# Patient Record
Sex: FEMALE | Race: BLACK OR AFRICAN AMERICAN | NOT HISPANIC OR LATINO
[De-identification: names, ages, dates, MRNs, and addresses within clinical notes are randomized per-mention and may not be internally consistent; named-entity substitution may affect disease eponyms.]

---

## 2017-01-31 ENCOUNTER — APPOINTMENT (OUTPATIENT)
Dept: OTOLARYNGOLOGY | Facility: CLINIC | Age: 62
End: 2017-01-31

## 2017-01-31 VITALS
BODY MASS INDEX: 25.18 KG/M2 | RESPIRATION RATE: 18 BRPM | DIASTOLIC BLOOD PRESSURE: 73 MMHG | HEART RATE: 61 BPM | SYSTOLIC BLOOD PRESSURE: 111 MMHG | WEIGHT: 170 LBS | HEIGHT: 69 IN

## 2017-01-31 VITALS — BODY MASS INDEX: 25.18 KG/M2 | WEIGHT: 170 LBS | RESPIRATION RATE: 18 BRPM | HEIGHT: 69 IN

## 2017-01-31 DIAGNOSIS — R51 HEADACHE: ICD-10-CM

## 2017-01-31 DIAGNOSIS — R42 DIZZINESS AND GIDDINESS: ICD-10-CM

## 2017-01-31 DIAGNOSIS — Z82.49 FAMILY HISTORY OF ISCHEMIC HEART DISEASE AND OTHER DISEASES OF THE CIRCULATORY SYSTEM: ICD-10-CM

## 2017-01-31 DIAGNOSIS — Z87.09 PERSONAL HISTORY OF OTHER DISEASES OF THE RESPIRATORY SYSTEM: ICD-10-CM

## 2017-01-31 DIAGNOSIS — M47.812 SPONDYLOSIS W/OUT MYELOPATHY OR RADICULOPATHY, CERVICAL REGION: ICD-10-CM

## 2017-01-31 DIAGNOSIS — J44.9 CHRONIC OBSTRUCTIVE PULMONARY DISEASE, UNSPECIFIED: ICD-10-CM

## 2017-01-31 DIAGNOSIS — M47.816 SPONDYLOSIS W/OUT MYELOPATHY OR RADICULOPATHY, CERVICAL REGION: ICD-10-CM

## 2017-01-31 DIAGNOSIS — Z87.891 PERSONAL HISTORY OF NICOTINE DEPENDENCE: ICD-10-CM

## 2017-01-31 DIAGNOSIS — H92.03 OTALGIA, BILATERAL: ICD-10-CM

## 2017-01-31 RX ORDER — AMLODIPINE BESYLATE 5 MG/1
TABLET ORAL
Refills: 0 | Status: ACTIVE | COMMUNITY

## 2017-01-31 RX ORDER — ASPIRIN 81 MG
81 TABLET, DELAYED RELEASE (ENTERIC COATED) ORAL
Refills: 0 | Status: ACTIVE | COMMUNITY

## 2017-02-01 PROBLEM — H92.03 OTALGIA, BILATERAL: Status: ACTIVE | Noted: 2017-02-01

## 2017-02-01 PROBLEM — R51 HEADACHE: Status: ACTIVE | Noted: 2017-02-01

## 2017-02-01 PROBLEM — R42 VERTIGO: Status: ACTIVE | Noted: 2017-01-31

## 2017-02-24 ENCOUNTER — APPOINTMENT (OUTPATIENT)
Dept: OTOLARYNGOLOGY | Facility: CLINIC | Age: 62
End: 2017-02-24

## 2017-04-04 ENCOUNTER — APPOINTMENT (OUTPATIENT)
Dept: OTOLARYNGOLOGY | Facility: CLINIC | Age: 62
End: 2017-04-04

## 2017-04-04 VITALS
HEIGHT: 69 IN | SYSTOLIC BLOOD PRESSURE: 136 MMHG | DIASTOLIC BLOOD PRESSURE: 79 MMHG | WEIGHT: 170 LBS | BODY MASS INDEX: 25.18 KG/M2 | HEART RATE: 76 BPM

## 2017-04-04 DIAGNOSIS — Z80.8 FAMILY HISTORY OF MALIGNANT NEOPLASM OF OTHER ORGANS OR SYSTEMS: ICD-10-CM

## 2017-04-04 DIAGNOSIS — M06.9 RHEUMATOID ARTHRITIS, UNSPECIFIED: ICD-10-CM

## 2017-04-04 DIAGNOSIS — R49.0 DYSPHONIA: ICD-10-CM

## 2017-04-04 DIAGNOSIS — M35.00 SICCA SYNDROME, UNSPECIFIED: ICD-10-CM

## 2017-04-04 DIAGNOSIS — R85.9 UNSPECIFIED ABNORMAL FINDING IN SPECIMENS FROM DIGESTIVE ORGANS AND ABDOMINAL CAVITY: ICD-10-CM

## 2017-04-04 DIAGNOSIS — R05 COUGH: ICD-10-CM

## 2017-04-04 RX ORDER — OMEPRAZOLE 20 MG/1
20 CAPSULE, DELAYED RELEASE ORAL DAILY
Qty: 30 | Refills: 5 | Status: ACTIVE | COMMUNITY
Start: 2017-04-04 | End: 1900-01-01

## 2017-08-28 ENCOUNTER — EMERGENCY (EMERGENCY)
Facility: HOSPITAL | Age: 62
LOS: 1 days | Discharge: HOME | End: 2017-08-28
Attending: EMERGENCY MEDICINE | Admitting: EMERGENCY MEDICINE
Payer: COMMERCIAL

## 2017-08-28 VITALS
SYSTOLIC BLOOD PRESSURE: 113 MMHG | HEART RATE: 79 BPM | RESPIRATION RATE: 20 BRPM | TEMPERATURE: 98 F | DIASTOLIC BLOOD PRESSURE: 67 MMHG | OXYGEN SATURATION: 100 %

## 2017-08-28 VITALS
RESPIRATION RATE: 16 BRPM | OXYGEN SATURATION: 100 % | HEART RATE: 65 BPM | DIASTOLIC BLOOD PRESSURE: 75 MMHG | SYSTOLIC BLOOD PRESSURE: 115 MMHG

## 2017-08-28 PROCEDURE — 73660 X-RAY EXAM OF TOE(S): CPT | Mod: 26,LT

## 2017-08-28 PROCEDURE — 99284 EMERGENCY DEPT VISIT MOD MDM: CPT

## 2017-08-28 PROCEDURE — 73562 X-RAY EXAM OF KNEE 3: CPT | Mod: 26,50

## 2017-08-28 RX ORDER — IBUPROFEN 200 MG
600 TABLET ORAL ONCE
Qty: 0 | Refills: 0 | Status: COMPLETED | OUTPATIENT
Start: 2017-08-28 | End: 2017-08-28

## 2017-08-28 RX ADMIN — Medication 600 MILLIGRAM(S): at 11:22

## 2017-08-28 RX ADMIN — Medication 40 MILLIGRAM(S): at 13:20

## 2017-08-28 NOTE — ED PROVIDER NOTE - CARE PLAN
Principal Discharge DX:	Rheumatoid arthritis involving multiple sites, unspecified rheumatoid factor presence  Secondary Diagnosis:	Pain in both knees, unspecified chronicity

## 2017-08-28 NOTE — ED ADULT NURSE NOTE - OBJECTIVE STATEMENT
Pt received to room 8 with c/o of left knee pain and swelling for the past few days pt knee is noted with swelling she has decreased ROM secondary to the pain. Her skin is intact respirations are even unlabored. Pt also report pain in her neck that radiates to her chest with movement only.

## 2017-08-28 NOTE — ED ADULT TRIAGE NOTE - CHIEF COMPLAINT QUOTE
Pt with HX of arthritis . was treated in first med yesterday told  to come to ER for xrays  . Pt states developed left knee stiffness and swelling to area and left big toe. since last night with . Pt also reports neck stiffness and pain in chest/rib  area pt states chest feels sore . Pt states today developed pain to right knee and right wrist pain. Pt co joint pain

## 2017-08-28 NOTE — ED PROVIDER NOTE - MEDICAL DECISION MAKING DETAILS
61f, h/o ra not currently on meds, with b/l knee pain and mild l. knee swelling, diffuse upper body pain, started 2 d ago. No erythema/warmth, able to ambulate, taking naproxen at home, d/w rheumatologist, recommending starting steroid taper 40mg x 3 and 20mg x 3d and will see her in next few days for further management.

## 2017-08-28 NOTE — ED PROVIDER NOTE - OBJECTIVE STATEMENT
61y F with PMHx of HTN, rheumatoid arthritis, Sjogren's disease, and diverticulitis presents to the ED with joint pain x 2 days. Pt states 3 nights ago she felt as though something was in her left big toe and tried to remove it. Pt woke up the next morning with joint pains. Pt states joint pain began at left knee, progressing to her neck, right knee, and wrists. Pt c/o wrist stiffness. Pt endorses hoarse voice. Was seen in urgent care and referred to ER for possible aspiration. Took Naproxen and Cyclobenzaprine yesterday. Did not take anything for pain today. No fever, no chills, no sob, no abd pain, no nausea, no vomiting. Medications: Amlodipine. 61y F with PMHx of HTN, rheumatoid arthritis, Sjogren's disease presents to the ED with joint pain x 2 days. Pt states 3 nights ago she felt as though something was in her left big toe and tried to remove it. Pt woke up the next morning with joint pains. Pt states joint pain began at left knee, progressing to her neck, right knee, and wrists. Pt c/o wrist stiffness. Was seen in urgent care and started on naproxen and flexeril which she took yesterday. Did not take anything for pain today. No fever, no chills, no sob, no abd pain, no nausea, no vomiting. Medications: Amlodipine. Rheumatologist Dr. Florentino. Patient has refused plaquinil by rheum 2/2 fear of side effects. No fevers, chills, ha, cp, sob, abd pain, vomiting, diarrhea, dysuria, weakness, or numbness.

## 2017-08-28 NOTE — ED PROVIDER NOTE - PROGRESS NOTE DETAILS
progress spoke to Dr. Aragon colleague of rheumatologist Dr. Florentino pt has hx of RA with Sjogren's. Had full work up showing elevated ESR. Had initially refused starting Plaquenil. Case discussed and recommending starting steroid course 40 mg TID and 20 mg TID and she will see pt at end of week progress spoke to Dr. Aragon colleague of rheumatologist Dr. Florentino pt has hx of RA with Sjogren's. Had full work up showing elevated ESR. Had initially refused starting Plaquenil. Case discussed and recommending starting steroid course 40 mg TID and 20 mg TID and she will see pt at end of week for further w/u progress spoke to Dr. Aragon colleague of rheumatologist Dr. Florentino pt has hx of RA with Sjogren's. Had full work up showing elevated ESR. Had initially refused starting Plaquenil. Case discussed and recommending starting steroid course 40 mg TID and 20 mg TID and she will see pt this week for further management. results d/w patient and copies given, understands symptoms to return and will f/u with rheum this week.

## 2017-08-28 NOTE — ED PROVIDER NOTE - MUSCULOSKELETAL MINIMAL EXAM
b/l le knee pain/swelling, L>R, Right with limited active rom, full passive rom, right with full active and passive rom, no erythema or warmth noted, pulses intact, left first toe with pain on ranging, able to freely range. diffusely tender across chest/shoulder/back, rom full, no swelling or deformities mild b/l le knee swelling, L>R, Right with limited active rom limited by pain, full passive rom, right with full active and passive rom, no erythema or warmth noted, pulses intact, left first toe with pain on ranging, able to freely range. diffusely tender across chest/shoulder/back, rom full, no swelling or deformities

## 2017-08-28 NOTE — ED PROVIDER NOTE - PMH
Arthritis    COPD (Chronic Obstructive Pulmonary Disease) (ICD9 496)    Diverticulitis    Hiatal hernia    Sjogren's disease

## 2018-06-26 ENCOUNTER — APPOINTMENT (OUTPATIENT)
Dept: OTOLARYNGOLOGY | Facility: CLINIC | Age: 63
End: 2018-06-26
Payer: COMMERCIAL

## 2018-06-26 VITALS
HEIGHT: 69 IN | BODY MASS INDEX: 23.99 KG/M2 | WEIGHT: 162 LBS | HEART RATE: 60 BPM | SYSTOLIC BLOOD PRESSURE: 124 MMHG | DIASTOLIC BLOOD PRESSURE: 79 MMHG

## 2018-06-26 DIAGNOSIS — Q30.2: ICD-10-CM

## 2018-06-26 DIAGNOSIS — J31.0 CHRONIC RHINITIS: ICD-10-CM

## 2018-06-26 PROCEDURE — 99213 OFFICE O/P EST LOW 20 MIN: CPT

## 2018-06-26 RX ORDER — BLOOD SUGAR DIAGNOSTIC
STRIP MISCELLANEOUS
Qty: 200 | Refills: 0 | Status: DISCONTINUED | COMMUNITY
Start: 2018-01-18

## 2018-06-26 RX ORDER — FLUTICASONE PROPIONATE 50 UG/1
50 SPRAY, METERED NASAL DAILY
Qty: 1 | Refills: 11 | Status: DISCONTINUED | COMMUNITY
Start: 2017-04-04 | End: 2018-06-26

## 2018-06-26 RX ORDER — KETOCONAZOLE 20 MG/G
2 CREAM TOPICAL
Qty: 30 | Refills: 0 | Status: ACTIVE | COMMUNITY
Start: 2018-01-10

## 2018-06-26 RX ORDER — LANCETS 33 GAUGE
EACH MISCELLANEOUS
Qty: 100 | Refills: 0 | Status: DISCONTINUED | COMMUNITY
Start: 2017-10-25

## 2018-06-26 RX ORDER — MUPIROCIN 20 MG/G
2 OINTMENT TOPICAL
Qty: 22 | Refills: 0 | Status: DISCONTINUED | COMMUNITY
Start: 2018-06-04

## 2018-06-26 RX ORDER — HYDROXYCHLOROQUINE SULFATE 200 MG/1
200 TABLET, FILM COATED ORAL
Qty: 120 | Refills: 0 | Status: DISCONTINUED | COMMUNITY
Start: 2017-12-11

## 2018-06-26 RX ORDER — CIPROFLOXACIN HYDROCHLORIDE 250 MG/1
250 TABLET, FILM COATED ORAL
Qty: 10 | Refills: 0 | Status: DISCONTINUED | COMMUNITY
Start: 2018-03-15

## 2018-06-26 RX ORDER — FLUTICASONE PROPIONATE 0.5 MG/G
0.05 CREAM TOPICAL
Qty: 15 | Refills: 0 | Status: DISCONTINUED | COMMUNITY
Start: 2018-01-10

## 2018-06-26 RX ORDER — CELECOXIB 200 MG/1
200 CAPSULE ORAL
Qty: 30 | Refills: 0 | Status: ACTIVE | COMMUNITY
Start: 2018-05-18

## 2018-06-26 RX ORDER — VALACYCLOVIR 1 G/1
1 TABLET, FILM COATED ORAL
Qty: 4 | Refills: 0 | Status: DISCONTINUED | COMMUNITY
Start: 2017-10-09

## 2018-06-26 RX ORDER — AMLODIPINE BESYLATE 2.5 MG/1
2.5 TABLET ORAL
Qty: 60 | Refills: 0 | Status: DISCONTINUED | COMMUNITY
Start: 2017-12-12

## 2018-06-26 RX ORDER — FLUOCINONIDE 0.5 MG/G
0.05 CREAM TOPICAL
Qty: 30 | Refills: 0 | Status: DISCONTINUED | COMMUNITY
Start: 2018-03-09

## 2018-07-03 ENCOUNTER — APPOINTMENT (OUTPATIENT)
Dept: OTOLARYNGOLOGY | Facility: CLINIC | Age: 63
End: 2018-07-03

## 2019-07-07 ENCOUNTER — INPATIENT (INPATIENT)
Facility: HOSPITAL | Age: 64
LOS: 1 days | Discharge: HOME | End: 2019-07-09
Attending: INTERNAL MEDICINE | Admitting: INTERNAL MEDICINE
Payer: COMMERCIAL

## 2019-07-07 VITALS
DIASTOLIC BLOOD PRESSURE: 82 MMHG | RESPIRATION RATE: 17 BRPM | HEART RATE: 66 BPM | SYSTOLIC BLOOD PRESSURE: 150 MMHG | TEMPERATURE: 98 F | OXYGEN SATURATION: 100 %

## 2019-07-07 DIAGNOSIS — R42 DIZZINESS AND GIDDINESS: ICD-10-CM

## 2019-07-07 DIAGNOSIS — I20.1 ANGINA PECTORIS WITH DOCUMENTED SPASM: ICD-10-CM

## 2019-07-07 DIAGNOSIS — M06.9 RHEUMATOID ARTHRITIS, UNSPECIFIED: ICD-10-CM

## 2019-07-07 PROBLEM — K57.92 DIVERTICULITIS OF INTESTINE, PART UNSPECIFIED, WITHOUT PERFORATION OR ABSCESS WITHOUT BLEEDING: Chronic | Status: ACTIVE | Noted: 2017-08-28

## 2019-07-07 LAB
ALBUMIN SERPL ELPH-MCNC: 4 G/DL — SIGNIFICANT CHANGE UP (ref 3.3–5)
ALP SERPL-CCNC: 60 U/L — SIGNIFICANT CHANGE UP (ref 40–120)
ALT FLD-CCNC: 12 U/L — SIGNIFICANT CHANGE UP (ref 4–33)
ANION GAP SERPL CALC-SCNC: 11 MMO/L — SIGNIFICANT CHANGE UP (ref 7–14)
APPEARANCE UR: CLEAR — SIGNIFICANT CHANGE UP
APTT BLD: 23.7 SEC — LOW (ref 27.5–36.3)
AST SERPL-CCNC: 16 U/L — SIGNIFICANT CHANGE UP (ref 4–32)
BASOPHILS # BLD AUTO: 0.02 K/UL — SIGNIFICANT CHANGE UP (ref 0–0.2)
BASOPHILS NFR BLD AUTO: 0.6 % — SIGNIFICANT CHANGE UP (ref 0–2)
BILIRUB SERPL-MCNC: 0.2 MG/DL — SIGNIFICANT CHANGE UP (ref 0.2–1.2)
BILIRUB UR-MCNC: NEGATIVE — SIGNIFICANT CHANGE UP
BLOOD UR QL VISUAL: NEGATIVE — SIGNIFICANT CHANGE UP
BUN SERPL-MCNC: 12 MG/DL — SIGNIFICANT CHANGE UP (ref 7–23)
CALCIUM SERPL-MCNC: 9 MG/DL — SIGNIFICANT CHANGE UP (ref 8.4–10.5)
CHLORIDE SERPL-SCNC: 106 MMOL/L — SIGNIFICANT CHANGE UP (ref 98–107)
CO2 SERPL-SCNC: 27 MMOL/L — SIGNIFICANT CHANGE UP (ref 22–31)
COLOR SPEC: COLORLESS — SIGNIFICANT CHANGE UP
CREAT SERPL-MCNC: 0.94 MG/DL — SIGNIFICANT CHANGE UP (ref 0.5–1.3)
EOSINOPHIL # BLD AUTO: 0.04 K/UL — SIGNIFICANT CHANGE UP (ref 0–0.5)
EOSINOPHIL NFR BLD AUTO: 1.3 % — SIGNIFICANT CHANGE UP (ref 0–6)
GLUCOSE SERPL-MCNC: 101 MG/DL — HIGH (ref 70–99)
GLUCOSE UR-MCNC: NEGATIVE — SIGNIFICANT CHANGE UP
HCT VFR BLD CALC: 36.7 % — SIGNIFICANT CHANGE UP (ref 34.5–45)
HGB BLD-MCNC: 11.8 G/DL — SIGNIFICANT CHANGE UP (ref 11.5–15.5)
IMM GRANULOCYTES NFR BLD AUTO: 0 % — SIGNIFICANT CHANGE UP (ref 0–1.5)
INR BLD: 1.1 — SIGNIFICANT CHANGE UP (ref 0.88–1.17)
KETONES UR-MCNC: NEGATIVE — SIGNIFICANT CHANGE UP
LEUKOCYTE ESTERASE UR-ACNC: NEGATIVE — SIGNIFICANT CHANGE UP
LYMPHOCYTES # BLD AUTO: 1.73 K/UL — SIGNIFICANT CHANGE UP (ref 1–3.3)
LYMPHOCYTES # BLD AUTO: 55.1 % — HIGH (ref 13–44)
MCHC RBC-ENTMCNC: 31.2 PG — SIGNIFICANT CHANGE UP (ref 27–34)
MCHC RBC-ENTMCNC: 32.2 % — SIGNIFICANT CHANGE UP (ref 32–36)
MCV RBC AUTO: 97.1 FL — SIGNIFICANT CHANGE UP (ref 80–100)
MONOCYTES # BLD AUTO: 0.29 K/UL — SIGNIFICANT CHANGE UP (ref 0–0.9)
MONOCYTES NFR BLD AUTO: 9.2 % — SIGNIFICANT CHANGE UP (ref 2–14)
NEUTROPHILS # BLD AUTO: 1.06 K/UL — LOW (ref 1.8–7.4)
NEUTROPHILS NFR BLD AUTO: 33.8 % — LOW (ref 43–77)
NITRITE UR-MCNC: NEGATIVE — SIGNIFICANT CHANGE UP
NRBC # FLD: 0.02 K/UL — SIGNIFICANT CHANGE UP (ref 0–0)
PH UR: 7 — SIGNIFICANT CHANGE UP (ref 5–8)
PLATELET # BLD AUTO: 188 K/UL — SIGNIFICANT CHANGE UP (ref 150–400)
PMV BLD: 11.4 FL — SIGNIFICANT CHANGE UP (ref 7–13)
POTASSIUM SERPL-MCNC: 3.8 MMOL/L — SIGNIFICANT CHANGE UP (ref 3.5–5.3)
POTASSIUM SERPL-SCNC: 3.8 MMOL/L — SIGNIFICANT CHANGE UP (ref 3.5–5.3)
PROT SERPL-MCNC: 7.2 G/DL — SIGNIFICANT CHANGE UP (ref 6–8.3)
PROT UR-MCNC: NEGATIVE — SIGNIFICANT CHANGE UP
PROTHROM AB SERPL-ACNC: 12.2 SEC — SIGNIFICANT CHANGE UP (ref 9.8–13.1)
RBC # BLD: 3.78 M/UL — LOW (ref 3.8–5.2)
RBC # FLD: 13 % — SIGNIFICANT CHANGE UP (ref 10.3–14.5)
SODIUM SERPL-SCNC: 144 MMOL/L — SIGNIFICANT CHANGE UP (ref 135–145)
SP GR SPEC: 1.01 — SIGNIFICANT CHANGE UP (ref 1–1.04)
TROPONIN T, HIGH SENSITIVITY: 6 NG/L — SIGNIFICANT CHANGE UP (ref ?–14)
UROBILINOGEN FLD QL: NORMAL — SIGNIFICANT CHANGE UP
WBC # BLD: 3.14 K/UL — LOW (ref 3.8–10.5)
WBC # FLD AUTO: 3.14 K/UL — LOW (ref 3.8–10.5)

## 2019-07-07 PROCEDURE — 70498 CT ANGIOGRAPHY NECK: CPT | Mod: 26

## 2019-07-07 PROCEDURE — 70496 CT ANGIOGRAPHY HEAD: CPT | Mod: 26

## 2019-07-07 PROCEDURE — 99223 1ST HOSP IP/OBS HIGH 75: CPT

## 2019-07-07 RX ORDER — DIPHENHYDRAMINE HCL 50 MG
50 CAPSULE ORAL EVERY 4 HOURS
Refills: 0 | Status: DISCONTINUED | OUTPATIENT
Start: 2019-07-07 | End: 2019-07-09

## 2019-07-07 RX ORDER — ONDANSETRON 8 MG/1
4 TABLET, FILM COATED ORAL EVERY 6 HOURS
Refills: 0 | Status: DISCONTINUED | OUTPATIENT
Start: 2019-07-07 | End: 2019-07-09

## 2019-07-07 RX ORDER — MECLIZINE HCL 12.5 MG
25 TABLET ORAL ONCE
Refills: 0 | Status: COMPLETED | OUTPATIENT
Start: 2019-07-07 | End: 2019-07-07

## 2019-07-07 RX ORDER — METOCLOPRAMIDE HCL 10 MG
10 TABLET ORAL ONCE
Refills: 0 | Status: COMPLETED | OUTPATIENT
Start: 2019-07-07 | End: 2019-07-07

## 2019-07-07 RX ORDER — MECLIZINE HCL 12.5 MG
25 TABLET ORAL EVERY 6 HOURS
Refills: 0 | Status: DISCONTINUED | OUTPATIENT
Start: 2019-07-07 | End: 2019-07-09

## 2019-07-07 RX ORDER — SODIUM CHLORIDE 9 MG/ML
1000 INJECTION INTRAMUSCULAR; INTRAVENOUS; SUBCUTANEOUS ONCE
Refills: 0 | Status: COMPLETED | OUTPATIENT
Start: 2019-07-07 | End: 2019-07-07

## 2019-07-07 RX ADMIN — SODIUM CHLORIDE 1000 MILLILITER(S): 9 INJECTION INTRAMUSCULAR; INTRAVENOUS; SUBCUTANEOUS at 08:53

## 2019-07-07 RX ADMIN — Medication 1 MILLIGRAM(S): at 07:58

## 2019-07-07 RX ADMIN — Medication 1 MILLIGRAM(S): at 11:33

## 2019-07-07 RX ADMIN — Medication 25 MILLIGRAM(S): at 19:23

## 2019-07-07 RX ADMIN — Medication 25 MILLIGRAM(S): at 07:59

## 2019-07-07 RX ADMIN — SODIUM CHLORIDE 1000 MILLILITER(S): 9 INJECTION INTRAMUSCULAR; INTRAVENOUS; SUBCUTANEOUS at 07:58

## 2019-07-07 RX ADMIN — Medication 10 MILLIGRAM(S): at 07:58

## 2019-07-07 NOTE — H&P ADULT - NSHPREVIEWOFSYSTEMS_GEN_ALL_CORE
Review of Systems:   CONSTITUTIONAL: No fever, weight loss, or fatigue  EYES: No eye pain, visual disturbances, or discharge  ENMT:  No difficulty hearing, tinnitus, vertigo; No sinus or throat pain  NECK: No pain or stiffness  BREASTS: No pain, masses, or nipple discharge  RESPIRATORY: No cough, wheezing, chills or hemoptysis; No shortness of breath  CARDIOVASCULAR: No chest pain, palpitations, dizziness, or leg swelling  GASTROINTESTINAL: No abdominal or epigastric pain. No hematemesis; No diarrhea or constipation. No melena or hematochezia. +Nausea  GENITOURINARY: No dysuria, frequency, hematuria, or incontinence  NEUROLOGICAL: No headaches, memory loss, loss of strength, numbness, or tremors  SKIN: No itching, burning, rashes, or lesions   LYMPH NODES: No enlarged glands  ENDOCRINE: No heat or cold intolerance; No hair loss  MUSCULOSKELETAL: No joint pain or swelling; No muscle, back, or extremity pain  PSYCHIATRIC: No depression, anxiety, mood swings, or difficulty sleeping  HEME/LYMPH: No easy bruising, or bleeding gums  ALLERGY AND IMMUNOLOGIC: No hives or eczema

## 2019-07-07 NOTE — ED PROVIDER NOTE - ATTENDING CONTRIBUTION TO CARE
MD Andrews:  patient seen and evaluated with the resident.  I was present for key portions of the History & Physical, and I agree with the Impression & Plan.  MD Andrews:  62 yo F, c/o severe room-spinning sensation.  Duration ~ 10 hrs.  Better: keeping head still.  Worse: head movement.  Intensity 10/10 when rotating head.  Associated Sx: no HA, no neck pain, no focal weakness, numbness, blurry/double vision, or slurred speech.  Context: Mhx Takotsubo cardiomyopathy.  Of note the patient has severe claustrophobia, and has required conscious sedation even for open MRI.  VS: unremarkable.  Physical Exam: adult F, mild distress, +horizontal nystagmus, neck supple, RRR, CTA B, Abd: s/nd/nt, Ext: no edema, Neuro: AAOx3, ambulates w/o diff, strength 5/5 & symmetric throughout.  Impression:  BPPV vs VBI.  Given Hx of severe claustrophobia, most practical approach would be to perform CTA head/neck, due to anticipated difficulties in performing MRI/MRA.  Not a good CDU candidate bc of severe claustrophobia.   Plan:  labs, ecg, ct head, cta head/neck, reassess. Patient signed out to Dr. Sams with CTA pending.

## 2019-07-07 NOTE — H&P ADULT - HISTORY OF PRESENT ILLNESS
62 y/o F with hx of vertigo, Sjogrens, RA, hiatal hernia, Takotsubo's cardiomyopathy, chronic chest pain p/w dizziness described as room spinning sensation since last night. Started with headache, frontal and left occipital, upset stomach and nausea. A few hours later developed dizziness, worse with movement of head and changes in position. +vertigo this episode more intense and lasting much longer. In ED given ativan 2 mg IV, reglan 10 and meclizine 62 y/o F with hx of vertigo, Sjogrens, RA, hiatal hernia, Takotsubo's cardiomyopathy, chronic chest pain p/w dizziness described as room spinning sensation since last night. Started with headache, frontal and left occipital, upset stomach and nausea. A few hours later developed dizziness, worse with movement of head and changes in position. +vertigo this episode more intense and lasting much longer. In ED given ativan 2 mg IV, reglan 10 and meclizine. Pt notes headache now resolved.

## 2019-07-07 NOTE — CONSULT NOTE ADULT - SUBJECTIVE AND OBJECTIVE BOX
HISTORY OF PRESENT ILLNESS: HPI:    Patient drowsy 2/2 medications given for severe HA. Interview assisted by  and ED documentation.       64 y/o F with hx of Sjogrens, RA, COPD, hiatal hernia, Takotsubo's cardiomyopathy, chronic chest pain presents with dizziness described as room spinning sensation since 9pm last night. Started with headache, frontal and left occipital, upset stomach and nausea/vomiting x 1 after eating dinner. A few hours later developed dizziness, worse with movement of head and changes in position. Pt has hx of vertigo but this episode has lasted longer and is more intense. Pt was admitted for chest pain in 2013, she had positive trops at the time and had cath and echo showing no CAD, EF 40%, showed apical ballooning syndrome consistent with Takosubo's cardiomyopathy. Recently seen in office where she has a stress echo with no evidence of inducible ischemia and echo with normal LV function. Denies chest pain, sob, cough, chills, fever, abd pain, le edema or weakness, sick contacts or recent travel. Cardiology following for dizziness, r/o cardiac nature.      PAST MEDICAL & SURGICAL HISTORY:  Diverticulitis  Arthritis  Sjogren's disease  Hiatal hernia  COPD (Chronic Obstructive Pulmonary Disease) (ICD9 496)  Fallopian tube disorder    MEDICATIONS:  MEDICATIONS  (STANDING):    Allergies    No Known Allergies    FAMILY HISTORY:  No pertinent family history in first degree relatives    Non-contributary for premature coronary disease or sudden cardiac death    SOCIAL HISTORY:    [X ] Non-smoker  [ ] Smoker  [ ] Alcohol      REVIEW OF SYSTEMS:  [ ]chest pain  [  ]shortness of breath  [  ]palpitations  [  ]syncope  [ ]near syncope [ ]upper extremity weakness   [ ] lower extremity weakness  [  ]diplopia  [  ]altered mental status   [X]  Headache [X] Dizziness   [  ]fevers  [ ]chills [ X]nausea  [ ]vomitting  [  ]dysphagia    [ ]abdominal pain  [ ]melena  [ ]BRBPR    [  ]epistaxis  [  ]rash    [ ]lower extremity edema        [ X] All others negative	  [ ] Unable to obtain    LABS:	 	                       11.8   3.14  )-----------( 188      ( 07 Jul 2019 07:28 )             36.7     Hb Trend: 11.8<--    07-07    144  |  106  |  12  ----------------------------<  101<H>  3.8   |  27  |  0.94    Ca    9.0      07 Jul 2019 07:28    TPro  7.2  /  Alb  4.0  /  TBili  0.2  /  DBili  x   /  AST  16  /  ALT  12  /  AlkPhos  60  07-07    Creatinine Trend: 0.94<--    Coags:  PT/INR - ( 07 Jul 2019 07:28 )   PT: 12.2 SEC;   INR: 1.10          PTT - ( 07 Jul 2019 07:28 )  PTT:23.7 SEC    proBNP:   Lipid Profile:   HgA1c:   TSH:     PHYSICAL EXAM:  T(C): 36.4 (07-07-19 @ 15:19), Max: 36.7 (07-07-19 @ 05:49)  HR: 52 (07-07-19 @ 15:19) (52 - 66)  BP: 151/61 (07-07-19 @ 15:19) (144/62 - 160/85)  RR: 16 (07-07-19 @ 15:19) (16 - 17)  SpO2: 100% (07-07-19 @ 15:19) (100% - 100%)  Wt(kg): --  I&O's Summary      Gen: Appears well in NAD  CV: N S1 S2 1/6 KAREN (+)2 Pulses B/l  Resp:  Clear to auscultation  B/L, normal effort  GI: (+) BS Soft, NT, ND  Lymph:  (-)Edema, (-)obvious lymphadenopathy  Skin: Warm to touch, Normal turgor      TELEMETRY: 	  NSR     ECG:  	    RADIOLOGY:         CXR:   < from: CT Angio Head w/ IV Cont (07.07.19 @ 11:40) >  IMPRESSION:     CT brain: There is no evidence of acute intracranial hemorrhage,   extra-axial collection, midline shift, or hydrocephalus.     CT angiography neck: No evidence of hemodynamically significant stenosis   by NASCET criteria. No evidence of vascular dissection. Ossification   posterior marginal ligament at C5-C6 narrowing the canal AP to 7.6 mm,   reversalof cervical lordosis.    CT angiography brain: No major vessel occlusion or proximal stenosis by   NASCET criteria. No evidence of aneurysm or other vascular malformation.    If symptoms persist, consider follow-up head CT or MRI if there are no   contra indications.    SAMIR LINARES M.D., RADIOLOGY RESIDENT  This document has been electronically signed.  AUGUSTIN SHARMA M.D., ATTENDING RADIOLOGIST  This document has been electronically signed. Jul 7 2019  1:26PM        < end of copied text >      ASSESSMENT/PLAN:     64 y/o F with hx of Sjogrens, RA, COPD, hiatal hernia, Takotsubo's cardiomyopathy, chronic chest pain presents with chest pain and dizziness. Cardiology following for dizziness, r/o cardiac nature.     -- no palpitations or chest pain, no sob, + dizziness  -- Trop x1  6 negative, would trend with CK, symptoms more c/w vertigo  -- last echo in our office with normal LV function, EF 53% improved from EF 44% in 2013   -- neurology consult appreciated; outpt f/u with ENT and vestibular rehab and neuro clinic  -- if chest pain persists would plan for ischemic eval   -- will follow with you

## 2019-07-07 NOTE — ED ADULT NURSE NOTE - NSIMPLEMENTINTERV_GEN_ALL_ED
Implemented All Universal Safety Interventions:  [place] to call system. Call bell, personal items and telephone within reach. Instruct patient to call for assistance. Room bathroom lighting operational. Non-slip footwear when patient is off stretcher. Physically safe environment: no spills, clutter or unnecessary equipment. Stretcher in lowest position, wheels locked, appropriate side rails in place.

## 2019-07-07 NOTE — ED ADULT NURSE REASSESSMENT NOTE - NS ED NURSE REASSESS COMMENT FT1
MD Ware at bedside placing IV guided ultrasound, pt reports anxiety, medicated as per MD orders prior to ct scan, pt stable, reports dizziness still, pt unsteady when ambulating, educated pt to not get up, safety maintained  at bedside pt sent to ct scan will monitor,

## 2019-07-07 NOTE — ED PROVIDER NOTE - SHIFT CHANGE DETAILS
Impression:  BPPV vs VBI.  Given Hx of severe claustrophobia, most practical approach would be to perform CTA head/neck, due to anticipated difficulties in performing MRI/MRA.  Not a good CDU candidate bc of severe claustrophobia.   Plan:  labs, ecg, ct head, cta head/neck, reassess. Patient signed out to Dr. Sams with CTA pending.

## 2019-07-07 NOTE — ED ADULT NURSE NOTE - OBJECTIVE STATEMENT
Patient is awake, A&O x 3, NAD, presents to ED for dizziness.  She was sitting watching tv when she started to experience a sudden onset of dizziness, 1 episode of vomiting and chest pain.  No SOB or weakness.  Per patient she has a history of elevated cardiac enzymes.  History of COPD, Sjogren's disease and hiatal hernia.  She has experienced vertigo in the past.  22g IV left AC, labs drawn and sent, vitals taken and will continue to monitor patient.

## 2019-07-07 NOTE — H&P ADULT - ASSESSMENT
Pt is 64 yo F w/ hx takotsubo cardiomyopathy, RA, Sjogrens, vertigo p/w dizziness. CTA H/N neg for vessel stenosis or obstruction still with persistent dizziness. Pt is 64 yo F w/ hx takotsubo cardiomyopathy, RA, Sjogrens, vertigo p/w HA dizziness. CTA H/N neg for vessel stenosis or obstruction still with persistent dizziness.

## 2019-07-07 NOTE — ED ADULT NURSE REASSESSMENT NOTE - NS ED NURSE REASSESS COMMENT FT1
break cvg rn: patient resting comfortably in nad, offers no complaints of pain or discomfort, patient admitted to hospital awaiting bed assignment at this time

## 2019-07-07 NOTE — ED PROVIDER NOTE - NS ED ROS FT
Associated Sx: no HA, no neck pain, no focal weakness, numbness, blurry/double vision, or slurred speech.

## 2019-07-07 NOTE — H&P ADULT - NSICDXPASTMEDICALHX_GEN_ALL_CORE_FT
PAST MEDICAL HISTORY:  Arthritis RA    COPD (Chronic Obstructive Pulmonary Disease) (ICD9 496)     Diverticulitis     Hiatal hernia     Sjogren's disease     Takotsubo cardiomyopathy s/p cath with normal coronaries

## 2019-07-07 NOTE — ED ADULT TRIAGE NOTE - CHIEF COMPLAINT QUOTE
Pt [facility], c/o Dizziness, Lightheaded, "Room is like Spinning" with Headache Chest pain, Neck pain Nausea, Denies SOB Vomiting  PMHX Vertigo Sjorgen's  RA

## 2019-07-07 NOTE — CONSULT NOTE ADULT - ASSESSMENT
63F  w/ PMH Sjorgen, RA, COPD, CM EF 40%, who presents w/ room-spinning dizziness for 1 day, w/ no changes in speech, swallowing vision, numbness/weakness. Exam findings remarkable for vertical nystagmus noted on upward and downward gaze, negative head thrust, mild RUE pronator drifts, and brisk reflexes throughout. CTH/CTA negative for any acute intracranial abnormality or major vessel occlusion.     Impression: Although patient has symptoms and exam findings that could be secondary to a central etiology (vertical nystagmus, RUE drift, hyperreflexia), the spontaneous improvement in her symptoms since yesterday, as well as the improvement w/ Meclizine are inconsistent w/ a central cause. Symptoms more likely to be secondary to a peripheral vertigo, BPPV being the most probable (given the positional nature of the symptoms). Less likely to be labyrinthitis (no recent infections) vs. Meinerres disease (no hearing changes, tinnitus)     Plan:    -No need for further imaging   -Can follow up with ENT for outpatient  -Refer to vestibular rehab (can send to [facility])   -Can follow up as need w/ Neurology outpatient (Clinic at [address] [place] [address] #[phone])

## 2019-07-07 NOTE — H&P ADULT - NSHPLABSRESULTS_GEN_ALL_CORE
11.8   3.14  )-----------( 188      ( 2019 07:28 )             36.7       -    144  |  106  |  12  ----------------------------<  101<H>  3.8   |  27  |  0.94    Ca    9.0      2019 07:28    TPro  7.2  /  Alb  4.0  /  TBili  0.2  /  DBili  x   /  AST  16  /  ALT  12  /  AlkPhos  60        CAPILLARY BLOOD GLUCOSE      POCT Blood Glucose.: 93 mg/dL (2019 05:57)      Urinalysis Basic - ( 2019 11:00 )    Color: COLORLESS / Appearance: CLEAR / S.009 / pH: 7.0  Gluc: NEGATIVE / Ketone: NEGATIVE  / Bili: NEGATIVE / Urobili: NORMAL   Blood: NEGATIVE / Protein: NEGATIVE / Nitrite: NEGATIVE   Leuk Esterase: NEGATIVE / RBC: x / WBC x   Sq Epi: x / Non Sq Epi: x / Bacteria: x        PT/INR - ( 2019 07:28 )   PT: 12.2 SEC;   INR: 1.10          PTT - ( 2019 07:28 )  PTT:23.7 SEC    Radiology  < from: CT Angio Head w/ IV Cont (19 @ 11:40) >    IMPRESSION:     CT brain: There is no evidence of acute intracranial hemorrhage,   extra-axial collection, midline shift, or hydrocephalus.     CT angiography neck: No evidence of hemodynamically significant stenosis   by NASCET criteria. No evidence of vascular dissection. Ossification   posterior marginal ligament at C5-C6 narrowing the canal AP to 7.6 mm,   reversalof cervical lordosis.    CT angiography brain: No major vessel occlusion or proximal stenosis by   NASCET criteria. No evidence of aneurysm or other vascular malformation.    If symptoms persist, consider follow-up head CT or MRI if there are no   contra indications.    < end of copied text >

## 2019-07-07 NOTE — ED ADULT NURSE REASSESSMENT NOTE - NS ED NURSE REASSESS COMMENT FT1
pt stable and comfortable  at bedside, denies n/v/d cp sob pt reports dizziness still like room spinning, pt medicated as per MD orders,

## 2019-07-07 NOTE — CONSULT NOTE ADULT - ATTENDING COMMENTS
Patient seen and examined with resident.  She c/o HA the day prior to onset of vertigo, not positional, with nausea.  She states she had diplopia yesterday and today images are blurred.  On examination she has right beating nystagmus on right gaze, up beating nystagmus on up gaze and a skew with looking down.  Toes down, power 5/5, face symmetric.     The symptoms along with the exam findings localize to the pulvinar of the thalamus as well as likely midbrain/christiana.  Needs urgent MRI brain w/wo contrast and MRA head and neck.  DDx includes evolving basilar stenosis vs. lacunar stroke vs. mass.  Will make recommendations after imaging complete today.

## 2019-07-07 NOTE — H&P ADULT - PROBLEM SELECTOR PLAN 1
-central vs pheripheral   -F/u full neuro c/s-neuro suspicious of peripheral vertigo but as per PE RT UE drift and not consistent with pheripheral vertigo will treat conservative measures x 24 hrs if improved consider MRI with sedation as pt is claustrophobic  -meclizine 25 mg PO q6 standing

## 2019-07-07 NOTE — ED PROVIDER NOTE - CLINICAL SUMMARY MEDICAL DECISION MAKING FREE TEXT BOX
64 y/o F with persistent dizziness for 1 day, inability to ambulate due to feeling unsteady, CTA head and neck done in ER neg. Pt seen by neuro who believes to be peripheral vertigo (see neuro consult note). Labs unremarkable. Pt given reglan, meclizine, ativan, benadryl in ER.  Will admit for persistent vertigo.

## 2019-07-07 NOTE — H&P ADULT - NSHPPHYSICALEXAM_GEN_ALL_CORE
Vital Signs Last 24 Hrs  T(C): 36.4 (07 Jul 2019 15:19), Max: 36.7 (07 Jul 2019 05:49)  T(F): 97.5 (07 Jul 2019 15:19), Max: 98.1 (07 Jul 2019 05:49)  HR: 52 (07 Jul 2019 15:19) (52 - 66)  BP: 151/61 (07 Jul 2019 15:19) (144/62 - 160/85)  BP(mean): --  RR: 16 (07 Jul 2019 15:19) (16 - 17)  SpO2: 100% (07 Jul 2019 15:19) (100% - 100%)    PHYSICAL EXAM:  GENERAL: states still feels dizzy   HEAD: Normocephalic  EYES: EOMI, conjunctiva and sclera clear  NECK: Supple  CHEST/LUNG: Clear to auscultation bilaterally; No wheeze  HEART: s1/s2  ABDOMEN: Soft, Nontender, Nondistended; Bowel sounds present  EXTREMITIES:  No clubbing, cyanosis, or edema  PSYCH: AAOx3  NEUROLOGY: +horizontal nstagmus dizziness worsen with position change  SKIN: No rashes or lesions

## 2019-07-07 NOTE — CONSULT NOTE ADULT - SUBJECTIVE AND OBJECTIVE BOX
CC: Dizziness    HPI:  63F  w/ PMH Sjorgen, RA, COPD, CM EF 40%, who presents w/ dizziness. Neurology consulted for dizziness.     Patient states she started feeling dizzy at 10pm yesterday, described as room-spinning sensation and feeling off balance. Dizziness came on suddenly while sitting down in the couch. Exacerbated w/ sudden head movements both horizontally and vertically, but not particularly worse w/ laying down or bending forward. Endorses nauasea and 1 episode of vomiting yesterday night. Symptoms w/ notable improvement with Meclizine given in the ED.  Denies any difficulty speaking, difficulty swallowing, double vision, facial assymetry, numbness/tingling/weakness, difficulty walking. Denies any recent ear infections hearing changes, tinnitus. Denies any previous history of dizziness.     Denies any previous history of stroke/TIA, seizures, or any other neurological process. Denies any cardiac arrythmias, CAD. Not on any anticoagulations, antiplatelet therapy.     A 10-system ROS was performed and is negative except for those items noted above and/or in the HPI.    PAST MEDICAL & SURGICAL HISTORY:  Sjogren's disease  COPD (Chronic Obstructive Pulmonary Disease) (ICD9 496)  Cardiomyopathy     FAMILY HISTORY:    SOCIAL HISTORY:   T/E/D: Never smoker     ALLERGIES/INTOLERANCES:  NKDA    VITALS & EXAMINATION:  Vital Signs Last 24 Hrs  T(C): 36.4 (07 Jul 2019 11:25), Max: 36.7 (07 Jul 2019 05:49)  T(F): 97.5 (07 Jul 2019 11:25), Max: 98.1 (07 Jul 2019 05:49)  HR: 52 (07 Jul 2019 11:25) (52 - 66)  BP: 144/62 (07 Jul 2019 11:25) (144/62 - 160/85)  BP(mean): --  RR: 17 (07 Jul 2019 11:25) (16 - 17)  SpO2: 100% (07 Jul 2019 11:25) (100% - 100%)    General:  Constitutional: Well appearing female in no distress   Head: Normocephalic & atraumatic.  ENT: TM intact. No nystagmus noted head thrust.   Respiratory: CTAB  Cardiovascular (>2): RRR no murmurs. Carotid pulsations symmetric, no bruits.     Neurological (>12):  MS: Alert to self, [facility], July 7th 2019. Santos Joy. Previous president Lucien. Follow simple commands, thumbs up, points to ceiling.     Language: Speech is clear, fluent with good repetition & comprehension (able to name objects watch, tag, pen, button.     CNs: PERRLA (R = 3mm, L = 3mm). VFF. EOMI, vertical nystagmus noted on upward and downward gaze. No horizontal nystagmus, no diplopia. V1-3 intact to LT/pinprick, well developed masseter muscles b/l. No facial asymmetry b/l, full eye closure strength b/l. Hearing grossly normal (rubbing fingers) b/l. Symmetric palate elevation in midline. Gag reflex deferred. Head turning & shoulder shrug intact b/l. Tongue midline, normal movements, no atrophy.    Motor: Normal muscle bulk & tone. No noticeable tremor or seizure. Mild pronater drift of RUE. Otherwise full strength throughout. 	     Sensation: Intact to Light touch and PP throghout     Cortical: Extinction on DSS (neglect): none    Reflexes: 3+ bilaterally throughout     Coordination: intact rapid-alt movements. No dysmetria to FTN/HTS    Gait: Deferred    LABORATORY:  CBC                       11.8   3.14  )-----------( 188      ( 07 Jul 2019 07:28 )             36.7     Chem 07-07    144  |  106  |  12  ----------------------------<  101<H>  3.8   |  27  |  0.94    Ca    9.0      07 Jul 2019 07:28    TPro  7.2  /  Alb  4.0  /  TBili  0.2  /  DBili  x   /  AST  16  /  ALT  12  /  AlkPhos  60  07-07    LFTs LIVER FUNCTIONS - ( 07 Jul 2019 07:28 )  Alb: 4.0 g/dL / Pro: 7.2 g/dL / ALK PHOS: 60 u/L / ALT: 12 u/L / AST: 16 u/L / GGT: x           Coagulopathy PT/INR - ( 07 Jul 2019 07:28 )   PT: 12.2 SEC;   INR: 1.10       PTT - ( 07 Jul 2019 07:28 )  PTT:23.7 SEC    Radiology (XR, CT, MR, U/S, TTE/ANJALI):  CTH negative for any acute intracranial abnormality  CT angiography negative for any major vessel occlusion

## 2019-07-07 NOTE — ED PROVIDER NOTE - OBJECTIVE STATEMENT
62 y/o F with hx of Sjogrens, RA, COPD, hiatal hernia, Takotsubo's cardiomyopathy, chronic chest pain presents with dizziness described as room spinning sensation since 9pm last night. Started with headache, frontal and left occipital, upset stomach and nausea/vomiting x 1 after eating dinner. A few hours later developed dizziness, worse with movement of head and changes in position. Pt has hx of vertigo but this episode has lasted longer and is more intense. On exam, pt is A&O x 3, PEERLA, No focal deficits, EOMI bilat with horizontal nystagmus (reproduces dizziness), finger to nose test is slow but intact.  Pt has severe claustrophobia, she will be unable to get MRI without conscious sedation.  Pending CTA head and neck. Pt given ativan and meclizine. Reviewed prior hospital records, Pt was admitted for chest pain in 2013, she had positive trops at the time and had cath and echo showing no CAD, EF 40%, showed apical ballooning syndrome consistent with Takosubo's cardiomyopathy.

## 2019-07-07 NOTE — CONSULT NOTE ADULT - ATTENDING COMMENTS
Patient seen and examined.  Agree with above.   admitted with HA and dizziness  last tte with normal lv function  follow up neuro workup  further workup pending clinical course    Davina Rhoades MD

## 2019-07-07 NOTE — ED ADULT NURSE REASSESSMENT NOTE - NS ED NURSE REASSESS COMMENT FT1
received report from CHUN De Jesus, pt A&OX3 c/o dizziness like room is spinning, states this is new and has never happened, denies n/v/d cp sob blurry vision, pt medicated by night nurse will reassess, safety maintained  at bedside, advised pt to not get up if she is dizzy call bell given, pt pending ct scan will monitor,

## 2019-07-07 NOTE — ED PROVIDER NOTE - CARE PLAN
Principal Discharge DX:	Vertigo Principal Discharge DX:	Vertigo of central origin, unspecified laterality

## 2019-07-08 LAB
ALBUMIN SERPL ELPH-MCNC: 3.3 G/DL — SIGNIFICANT CHANGE UP (ref 3.3–5)
ALP SERPL-CCNC: 52 U/L — SIGNIFICANT CHANGE UP (ref 40–120)
ALT FLD-CCNC: 10 U/L — SIGNIFICANT CHANGE UP (ref 4–33)
ANION GAP SERPL CALC-SCNC: 10 MMO/L — SIGNIFICANT CHANGE UP (ref 7–14)
AST SERPL-CCNC: 13 U/L — SIGNIFICANT CHANGE UP (ref 4–32)
BASOPHILS # BLD AUTO: 0.03 K/UL — SIGNIFICANT CHANGE UP (ref 0–0.2)
BASOPHILS NFR BLD AUTO: 0.9 % — SIGNIFICANT CHANGE UP (ref 0–2)
BILIRUB SERPL-MCNC: 0.2 MG/DL — SIGNIFICANT CHANGE UP (ref 0.2–1.2)
BUN SERPL-MCNC: 12 MG/DL — SIGNIFICANT CHANGE UP (ref 7–23)
CALCIUM SERPL-MCNC: 8.8 MG/DL — SIGNIFICANT CHANGE UP (ref 8.4–10.5)
CHLORIDE SERPL-SCNC: 106 MMOL/L — SIGNIFICANT CHANGE UP (ref 98–107)
CO2 SERPL-SCNC: 26 MMOL/L — SIGNIFICANT CHANGE UP (ref 22–31)
CREAT SERPL-MCNC: 0.89 MG/DL — SIGNIFICANT CHANGE UP (ref 0.5–1.3)
EOSINOPHIL # BLD AUTO: 0.06 K/UL — SIGNIFICANT CHANGE UP (ref 0–0.5)
EOSINOPHIL NFR BLD AUTO: 1.8 % — SIGNIFICANT CHANGE UP (ref 0–6)
GLUCOSE SERPL-MCNC: 78 MG/DL — SIGNIFICANT CHANGE UP (ref 70–99)
HCT VFR BLD CALC: 34 % — LOW (ref 34.5–45)
HCV AB S/CO SERPL IA: 0.09 S/CO — SIGNIFICANT CHANGE UP (ref 0–0.99)
HCV AB SERPL-IMP: SIGNIFICANT CHANGE UP
HGB BLD-MCNC: 11.1 G/DL — LOW (ref 11.5–15.5)
IMM GRANULOCYTES NFR BLD AUTO: 1.5 % — SIGNIFICANT CHANGE UP (ref 0–1.5)
LYMPHOCYTES # BLD AUTO: 1.74 K/UL — SIGNIFICANT CHANGE UP (ref 1–3.3)
LYMPHOCYTES # BLD AUTO: 50.9 % — HIGH (ref 13–44)
MCHC RBC-ENTMCNC: 31.2 PG — SIGNIFICANT CHANGE UP (ref 27–34)
MCHC RBC-ENTMCNC: 32.6 % — SIGNIFICANT CHANGE UP (ref 32–36)
MCV RBC AUTO: 95.5 FL — SIGNIFICANT CHANGE UP (ref 80–100)
MONOCYTES # BLD AUTO: 0.29 K/UL — SIGNIFICANT CHANGE UP (ref 0–0.9)
MONOCYTES NFR BLD AUTO: 8.5 % — SIGNIFICANT CHANGE UP (ref 2–14)
NEUTROPHILS # BLD AUTO: 1.25 K/UL — LOW (ref 1.8–7.4)
NEUTROPHILS NFR BLD AUTO: 36.4 % — LOW (ref 43–77)
NRBC # FLD: 0 K/UL — SIGNIFICANT CHANGE UP (ref 0–0)
PLATELET # BLD AUTO: 142 K/UL — LOW (ref 150–400)
PMV BLD: SIGNIFICANT CHANGE UP FL (ref 7–13)
POTASSIUM SERPL-MCNC: 3.9 MMOL/L — SIGNIFICANT CHANGE UP (ref 3.5–5.3)
POTASSIUM SERPL-SCNC: 3.9 MMOL/L — SIGNIFICANT CHANGE UP (ref 3.5–5.3)
PROT SERPL-MCNC: 6.2 G/DL — SIGNIFICANT CHANGE UP (ref 6–8.3)
RBC # BLD: 3.56 M/UL — LOW (ref 3.8–5.2)
RBC # FLD: 12.9 % — SIGNIFICANT CHANGE UP (ref 10.3–14.5)
REVIEW TO FOLLOW: YES — SIGNIFICANT CHANGE UP
SODIUM SERPL-SCNC: 142 MMOL/L — SIGNIFICANT CHANGE UP (ref 135–145)
WBC # BLD: 3.42 K/UL — LOW (ref 3.8–10.5)
WBC # FLD AUTO: 3.42 K/UL — LOW (ref 3.8–10.5)

## 2019-07-08 PROCEDURE — 70549 MR ANGIOGRAPH NECK W/O&W/DYE: CPT | Mod: 26

## 2019-07-08 PROCEDURE — 99223 1ST HOSP IP/OBS HIGH 75: CPT

## 2019-07-08 PROCEDURE — 70544 MR ANGIOGRAPHY HEAD W/O DYE: CPT | Mod: 26,59

## 2019-07-08 PROCEDURE — 70553 MRI BRAIN STEM W/O & W/DYE: CPT | Mod: 26

## 2019-07-08 RX ORDER — LEUCOVORIN CALCIUM 5 MG
1 TABLET ORAL
Qty: 0 | Refills: 0 | DISCHARGE

## 2019-07-08 RX ORDER — AMLODIPINE BESYLATE 2.5 MG/1
1 TABLET ORAL
Qty: 0 | Refills: 0 | DISCHARGE

## 2019-07-08 RX ORDER — OSPEMIFENE 60 MG/1
1 TABLET, FILM COATED ORAL
Qty: 0 | Refills: 0 | DISCHARGE

## 2019-07-08 RX ORDER — VALACYCLOVIR 500 MG/1
1 TABLET, FILM COATED ORAL
Qty: 0 | Refills: 0 | DISCHARGE

## 2019-07-08 RX ORDER — FOLIC ACID 0.8 MG
1 TABLET ORAL
Qty: 0 | Refills: 0 | DISCHARGE

## 2019-07-08 RX ORDER — METHOTREXATE 2.5 MG/1
8 TABLET ORAL
Qty: 0 | Refills: 0 | DISCHARGE

## 2019-07-08 RX ADMIN — Medication 25 MILLIGRAM(S): at 00:04

## 2019-07-08 RX ADMIN — Medication 25 MILLIGRAM(S): at 17:52

## 2019-07-08 RX ADMIN — Medication 25 MILLIGRAM(S): at 05:57

## 2019-07-08 RX ADMIN — Medication 1 MILLIGRAM(S): at 10:44

## 2019-07-08 RX ADMIN — Medication 25 MILLIGRAM(S): at 13:52

## 2019-07-08 NOTE — PROGRESS NOTE ADULT - PROBLEM SELECTOR PLAN 1
< from: MR Angio Neck w/wo IV Cont (07.08.19 @ 13:10) >    1. On the brain MRI, there is no gross evidence for intracranial mass,   acute infarct, or acute hemorrhage.  2. On the brain MRA, there is no gross evidence for intracranial stenosis   or major vessel occlusion.  3. On the neck MRA, a mild stenosis involves origin of the left internal   carotid artery.      < end of copied text >as per neruo , no further w/u - likely BPV - meclizine

## 2019-07-08 NOTE — PROGRESS NOTE ADULT - ASSESSMENT
Pt is 62 yo F w/ hx takotsubo cardiomyopathy, RA, Sjogrens, vertigo p/w HA dizziness. CTA H/N neg for vessel stenosis or obstruction still with persistent dizziness.

## 2019-07-09 ENCOUNTER — TRANSCRIPTION ENCOUNTER (OUTPATIENT)
Age: 64
End: 2019-07-09

## 2019-07-09 VITALS
HEART RATE: 67 BPM | TEMPERATURE: 98 F | SYSTOLIC BLOOD PRESSURE: 129 MMHG | DIASTOLIC BLOOD PRESSURE: 74 MMHG | OXYGEN SATURATION: 100 % | RESPIRATION RATE: 17 BRPM

## 2019-07-09 LAB
ANION GAP SERPL CALC-SCNC: 10 MMO/L — SIGNIFICANT CHANGE UP (ref 7–14)
BUN SERPL-MCNC: 18 MG/DL — SIGNIFICANT CHANGE UP (ref 7–23)
CALCIUM SERPL-MCNC: 8.6 MG/DL — SIGNIFICANT CHANGE UP (ref 8.4–10.5)
CHLORIDE SERPL-SCNC: 104 MMOL/L — SIGNIFICANT CHANGE UP (ref 98–107)
CO2 SERPL-SCNC: 26 MMOL/L — SIGNIFICANT CHANGE UP (ref 22–31)
CREAT SERPL-MCNC: 0.99 MG/DL — SIGNIFICANT CHANGE UP (ref 0.5–1.3)
GLUCOSE SERPL-MCNC: 97 MG/DL — SIGNIFICANT CHANGE UP (ref 70–99)
HCT VFR BLD CALC: 33.1 % — LOW (ref 34.5–45)
HGB BLD-MCNC: 10.6 G/DL — LOW (ref 11.5–15.5)
MAGNESIUM SERPL-MCNC: 1.9 MG/DL — SIGNIFICANT CHANGE UP (ref 1.6–2.6)
MCHC RBC-ENTMCNC: 31.6 PG — SIGNIFICANT CHANGE UP (ref 27–34)
MCHC RBC-ENTMCNC: 32 % — SIGNIFICANT CHANGE UP (ref 32–36)
MCV RBC AUTO: 98.8 FL — SIGNIFICANT CHANGE UP (ref 80–100)
NRBC # FLD: 0 K/UL — SIGNIFICANT CHANGE UP (ref 0–0)
PHOSPHATE SERPL-MCNC: 3.8 MG/DL — SIGNIFICANT CHANGE UP (ref 2.5–4.5)
PLATELET # BLD AUTO: 170 K/UL — SIGNIFICANT CHANGE UP (ref 150–400)
PMV BLD: 11.4 FL — SIGNIFICANT CHANGE UP (ref 7–13)
POTASSIUM SERPL-MCNC: 4.2 MMOL/L — SIGNIFICANT CHANGE UP (ref 3.5–5.3)
POTASSIUM SERPL-SCNC: 4.2 MMOL/L — SIGNIFICANT CHANGE UP (ref 3.5–5.3)
RBC # BLD: 3.35 M/UL — LOW (ref 3.8–5.2)
RBC # FLD: 13 % — SIGNIFICANT CHANGE UP (ref 10.3–14.5)
SODIUM SERPL-SCNC: 140 MMOL/L — SIGNIFICANT CHANGE UP (ref 135–145)
WBC # BLD: 3.09 K/UL — LOW (ref 3.8–10.5)
WBC # FLD AUTO: 3.09 K/UL — LOW (ref 3.8–10.5)

## 2019-07-09 PROCEDURE — 99231 SBSQ HOSP IP/OBS SF/LOW 25: CPT

## 2019-07-09 RX ORDER — MECLIZINE HCL 12.5 MG
1 TABLET ORAL
Qty: 120 | Refills: 0
Start: 2019-07-09 | End: 2019-08-07

## 2019-07-09 RX ADMIN — Medication 25 MILLIGRAM(S): at 11:14

## 2019-07-09 RX ADMIN — Medication 25 MILLIGRAM(S): at 05:55

## 2019-07-09 RX ADMIN — Medication 25 MILLIGRAM(S): at 00:14

## 2019-07-09 NOTE — PROGRESS NOTE ADULT - ASSESSMENT
Pt is 64 yo F w/ hx takotsubo cardiomyopathy, RA, Sjogrens, vertigo p/w HA dizziness. CTA H/N neg for vessel stenosis or obstruction still with persistent dizziness.

## 2019-07-09 NOTE — PROGRESS NOTE ADULT - ASSESSMENT
3F  w/ PMH Sjorgen, RA, COPD, CM EF 40%, who presents w/ room-spinning dizziness for 1 day, w/ no changes in speech, swallowing vision, numbness/weakness. Exam findings unremarkable on testing today. CTH/CTA negative for any acute intracranial abnormality or major vessel occlusion.     Impression: The spontaneous improvement in her symptoms since yesterday, as well as the improvement w/ Meclizine are inconsistent w/ a central cause. Symptoms more likely to be secondary to a peripheral vertigo  Likely Migraine w/ Aura.     Plan:    -No need for further imaging   -Can follow up as need w/ Neurology outpatient (Clinic at [address] [place] [address] #[phone])

## 2019-07-09 NOTE — DISCHARGE NOTE PROVIDER - PROVIDER TOKENS
PROVIDER:[TOKEN:[[id number]]],PROVIDER:[TOKEN:[[id number]]],PROVIDER:[TOKEN:[[id number]]],FREE:[LAST:[ENT],PHONE:[phone]],FAX:[(   )    -]]

## 2019-07-09 NOTE — PROGRESS NOTE ADULT - PROBLEM SELECTOR PLAN 1
< from: MR Angio Neck w/wo IV Cont (07.08.19 @ 13:10) >    1. On the brain MRI, there is no gross evidence for intracranial mass,   acute infarct, or acute hemorrhage.  2. On the brain MRA, there is no gross evidence for intracranial stenosis   or major vessel occlusion.  3. On the neck MRA, a mild stenosis involves origin of the left internal   carotid artery.      < end of copied text >as per neuro , no further w/u - likely BPV - meclizine  neuro cleared pt for dc

## 2019-07-09 NOTE — DISCHARGE NOTE PROVIDER - HOSPITAL COURSE
Dx: Dizziness    Pt is 64 yo F w/ hx takotsubo cardiomyopathy, RA, Sjogrens, vertigo p/w HA dizziness. CTA H/N neg for vessel stenosis or obstruction still with persistent dizziness.             Vertigo    -Neurology following     -CTA H/N neg for vessel stenosis or obstruction still with persistent dizziness.     --MRI/MRA of brain/Neck w/wo contrast:    1. On the brain MRI, there is no gross evidence for intracranial mass,     acute infarct, or acute hemorrhage.    2. On the brain MRA, there is no gross evidence for intracranial stenosis     or major vessel occlusion.    3. On the neck MRA, a mild stenosis involves origin of the left internal     carotid artery.    -Can follow up with ENT for outpatient    -Refer to vestibular rehab (can send to [facility])     -Can follow up as need w/ Neurology outpatient (Clinic at [address] [place] [address] #[phone])    -meclizine as needed     -cards following     -- no palpitations or chest pain, no sob, + dizziness    -- Trop x1  6 negative, would trend with CK, symptoms more c/w vertigo    -- last echo in our office with normal LV function, EF 53% improved from EF 44% in 2013     -- if chest pain persists would plan for ischemic eval     -follow up with Dr. Yoder and Dr. Sánchez after dc         Rheumatoid arthritis     -continue with  on methotrexate and leucovorin as outpt    Please follow up with your rheumatologist within 1 week of discharge.  Please call to make     an appointment.          Coronary vasospasm     -norvasc 2.5 mg PO qdaily.     -follow up with Dr. Yoder and Dr. Sánchez after dc                 Dispo: Home

## 2019-07-09 NOTE — PROGRESS NOTE ADULT - SUBJECTIVE AND OBJECTIVE BOX
Subjective: Dizziness improve. no chest pain or sob  	    MEDICATIONS  (STANDING):  meclizine 25 milliGRAM(s) Oral every 6 hours    MEDICATIONS  (PRN):  diphenhydrAMINE   Injectable 50 milliGRAM(s) IntraMuscular every 4 hours PRN Rash and/or Itching  ondansetron Injectable 4 milliGRAM(s) IV Push every 6 hours PRN Nausea      LABS:                            10.6   3.09  )-----------( 170      ( 09 Jul 2019 07:20 )             33.1     Hemoglobin: 10.6 g/dL (07-09 @ 07:20)  Hemoglobin: 11.1 g/dL (07-08 @ 06:17)  Hemoglobin: 11.8 g/dL (07-07 @ 07:28)    07-09    140  |  104  |  18  ----------------------------<  97  4.2   |  26  |  0.99    Ca    8.6      09 Jul 2019 07:20  Phos  3.8     07-09  Mg     1.9     07-09    TPro  6.2  /  Alb  3.3  /  TBili  0.2  /  DBili  x   /  AST  13  /  ALT  10  /  AlkPhos  52  07-08    Creatinine Trend: 0.99<--, 0.89<--, 0.94<--           PHYSICAL EXAM  Vital Signs Last 24 Hrs  T(C): 36.8 (09 Jul 2019 05:54), Max: 37.1 (08 Jul 2019 14:11)  T(F): 98.2 (09 Jul 2019 05:54), Max: 98.8 (08 Jul 2019 14:11)  HR: 64 (09 Jul 2019 05:54) (64 - 74)  BP: 114/56 (09 Jul 2019 05:54) (114/56 - 140/82)  BP(mean): --  RR: 18 (09 Jul 2019 05:54) (18 - 20)  SpO2: 100% (09 Jul 2019 05:54) (96% - 100%)      Appearance: Normal		  Lymphatic: No lymphadenopathy , no edema  Cardiovascular: Normal S1 S2, No JVD, No murmurs , Peripheral pulses palpable 2+ bilaterally  Respiratory: Lungs clear to auscultation, normal effort 	  Gastrointestinal:  Soft, Non-tender, + BS	  Skin: No rashes, No ecchymoses, No cyanosis, warm to touch  Psychiatry:  Mood & affect appropriate    TELEMETRY: 	    ECG:  < from: 12 Lead ECG (07.07.19 @ 05:56) >  Diagnosis Line Sinus rhythm with frequent Premature ventricular complexes  Otherwise normal ECG    < end of copied text >  	  RADIOLOGY:   DIAGNOSTIC TESTING:  [ ] Echocardiogram:   [ ]  Catheterization: < from: Cardiac Cath Lab (08.24.13 @ 19:50) >  CORONARY VESSELS: The coronary circulation is right dominant.  LM:   --  LM: Normal.  LAD:   --  LAD: Normal.  CX:   --  Circumflex: Normal.  RCA:   --  RCA: Normal.  COMPLICATIONS: There were no complications.    < end of copied text >    [ ] Stress Test:    OTHER: 	    MRI - IMPRESSION:    1. On the brain MRI, there is no gross evidence for intracranial mass,   acute infarct, or acute hemorrhage.  2. On the brain MRA, there is no gross evidence for intracranial stenosis   or major vessel occlusion.  3. On the neck MRA, a mild stenosis involves origin of the left internal   carotid artery.      ASSESSMENT/PLAN: 64 y/o F with hx of Sjogrens, RA, COPD, hiatal hernia, Takotsubo's cardiomyopathy, chronic chest pain presents admitted with HA and dizziness.     -pt. denies chest pain or anginal symptoms  -last ischemic evaluation with cardiac catheterization showed normal coronary arteries  -last TTE from the office showed normalization of LVEF  -no clinical heart failure  -MRI noted above  -no further cardiac workup anticipated at this time  -follow up with Dr. Yoder and Dr. Sánchez after dc   -Has appt with Dr. Cortez on 7/16 at 11:15 AM  -Has appt with Dr. Sánchez on 8/30 at 2:15 PM    Renan Arias PA-C  [facility]  Pager: [phone]
Neurology  Progress Note  07-09-19    Name:  ISIDRO BETTENCOURT; 63y    Interval History:  Symptoms resolved. Reports sinus headache. Chronic. No other complaints.    HPI:  63F  w/ PMH Sjorgen, RA, COPD, CM EF 40%, who presents w/ dizziness. Neurology consulted for dizziness.     Patient states she started feeling dizzy at 10pm yesterday, described as room-spinning sensation and feeling off balance. Dizziness came on suddenly while sitting down in the couch. Exacerbated w/ sudden head movements both horizontally and vertically, but not particularly worse w/ laying down or bending forward. Endorses nauasea and 1 episode of vomiting yesterday night. Symptoms w/ notable improvement with Meclizine given in the ED.  Denies any difficulty speaking, difficulty swallowing, double vision, facial assymetry, numbness/tingling/weakness, difficulty walking. Denies any recent ear infections hearing changes, tinnitus. Denies any previous history of dizziness.     Denies any previous history of stroke/TIA, seizures, or any other neurological process. Denies any cardiac arrythmias, CAD. Not on any anticoagulations, antiplatelet therapy.     A 10-system ROS was performed and is negative except for those items noted above and/or in the HPI.    REVIEW OF SYSTEMS:  As states in HPI.    Medications:  diphenhydrAMINE   Injectable 50 milliGRAM(s) IntraMuscular every 4 hours PRN  LORazepam     Tablet 1 milliGRAM(s) Oral once  LORazepam   Injectable 1 milliGRAM(s) IV Push Once  LORazepam   Injectable 1 milliGRAM(s) IV Push Once  meclizine 25 milliGRAM(s) Oral Once  meclizine 25 milliGRAM(s) Oral every 6 hours  metoclopramide Injectable 10 milliGRAM(s) IV Push once  ondansetron Injectable 4 milliGRAM(s) IV Push every 6 hours PRN  sodium chloride 0.9% Bolus 1000 milliLiter(s) IV Bolus once    Vitals:  T(C): 36.8 (07-09-19 @ 05:54), Max: 37.1 (07-08-19 @ 14:11)  HR: 64 (07-09-19 @ 05:54) (64 - 74)  BP: 114/56 (07-09-19 @ 05:54) (114/56 - 140/82)  RR: 18 (07-09-19 @ 05:54) (18 - 20)  SpO2: 100% (07-09-19 @ 05:54) (96% - 100%)    Labs:                        10.6   3.09  )-----------( 170      ( 09 Jul 2019 07:20 )             33.1     07-09    140  |  104  |  18  ----------------------------<  97  4.2   |  26  |  0.99    Ca    8.6      09 Jul 2019 07:20  Phos  3.8     07-09  Mg     1.9     07-09    TPro  6.2  /  Alb  3.3  /  TBili  0.2  /  DBili  x   /  AST  13  /  ALT  10  /  AlkPhos  52  07-08    CAPILLARY BLOOD GLUCOSE        LIVER FUNCTIONS - ( 08 Jul 2019 06:17 )  Alb: 3.3 g/dL / Pro: 6.2 g/dL / ALK PHOS: 52 u/L / ALT: 10 u/L / AST: 13 u/L / GGT: x           General:  Constitutional: Well appearing female in no distress   Head: Normocephalic & atraumatic.  ENT: No nystagmus noted head thrust.     Neurological (>12):  MS: Alert to self, [facility], July 9th 2019. [place]. 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 Follow simple commands, thumbs up, points to ceiling.     Language: Speech is clear, fluent with good repetition & comprehension (able to name objects watch, tag, pen, button.     CNs: PERRLA (R = 3mm, L = 3mm). VFF. EOMI, No horizontal nystagmus, no diplopia. V1-3 intact to LT/pinprick, well developed masseter muscles b/l. No facial asymmetry b/l, full eye closure strength b/l. Hearing grossly normal (rubbing fingers) b/l. Symmetric palate elevation in midline. Gag reflex deferred. Head turning & shoulder shrug intact b/l. Tongue midline, normal movements, no atrophy.    Motor: Normal muscle bulk & tone. No noticeable tremor or seizure. Full strength throughout. 	     Sensation: Intact to Light touch and PP throghout     Reflexes: 3+ bilaterally throughout     Coordination: intact rapid-alt movements. No dysmetria to FTN/HTS    Gait: Deferred    Radiology:  < from: MR Angio Neck w/wo IV Cont (07.08.19 @ 13:10) >  IMPRESSION:    1. On the brain MRI, there is no gross evidence for intracranial mass,   acute infarct, or acute hemorrhage.  2. On the brain MRA, there is no gross evidence for intracranial stenosis   or major vessel occlusion.  3. On the neck MRA, a mild stenosis involves origin of the left internal   carotid artery.    CANDY VILLALOBOS M.D., ATTENDING RADIOLOGIST  This document has been electronically signed. Jul 8 2019  1:39PM  < end of copied text >
SUBJECTIVE / OVERNIGHT EVENTS: pt c/o  dizziness denies nausea, vomiting       MEDICATIONS  (STANDING):  meclizine 25 milliGRAM(s) Oral every 6 hours    MEDICATIONS  (PRN):  diphenhydrAMINE   Injectable 50 milliGRAM(s) IntraMuscular every 4 hours PRN Rash and/or Itching  ondansetron Injectable 4 milliGRAM(s) IV Push every 6 hours PRN Nausea    Vital Signs Last 24 Hrs  T(C): 36.8 (2019 21:56), Max: 37.1 (2019 14:11)  T(F): 98.2 (2019 21:56), Max: 98.8 (2019 14:11)  HR: 64 (2019 21:56) (55 - 74)  BP: 132/86 (2019 21:56) (117/64 - 140/82)  BP(mean): --  RR: 18 (2019 21:56) (18 - 20)  SpO2: 99% (2019 21:56) (96% - 99%)    CAPILLARY BLOOD GLUCOSE        I&O's Summary      Constitutional: No fever, fatigue  Skin: No rash.  Eyes: No recent vision problems or eye pain.  ENT: No congestion, ear pain, or sore throat.  Cardiovascular: No chest pain or palpation.  Respiratory: No cough, shortness of breath, congestion, or wheezing.  Gastrointestinal: No abdominal pain, nausea, vomiting, or diarrhea.  Genitourinary: No dysuria.  Musculoskeletal: No joint swelling.  Neurologic: No headache. dizziness+    PHYSICAL EXAM:  GENERAL: NAD  EYES: EOMI, PERRLA  NECK: Supple, No JVD  CHEST/LUNG: cta camden   HEART:  S1 , S2 +  ABDOMEN: soft , bs+  EXTREMITIES:  no edema  NEUROLOGY:alert awake oriented       LABS:                        11.1   3.42  )-----------( 142      ( 2019 06:17 )             34.0     07-08    142  |  106  |  12  ----------------------------<  78  3.9   |  26  |  0.89    Ca    8.8      2019 06:17    TPro  6.2  /  Alb  3.3  /  TBili  0.2  /  DBili  x   /  AST  13  /  ALT  10  /  AlkPhos  52  07-08    PT/INR - ( 2019 07:28 )   PT: 12.2 SEC;   INR: 1.10          PTT - ( 2019 07:28 )  PTT:23.7 SEC      Urinalysis Basic - ( 2019 11:00 )    Color: COLORLESS / Appearance: CLEAR / S.009 / pH: 7.0  Gluc: NEGATIVE / Ketone: NEGATIVE  / Bili: NEGATIVE / Urobili: NORMAL   Blood: NEGATIVE / Protein: NEGATIVE / Nitrite: NEGATIVE   Leuk Esterase: NEGATIVE / RBC: x / WBC x   Sq Epi: x / Non Sq Epi: x / Bacteria: x        RADIOLOGY & ADDITIONAL TESTS:    Imaging Personally Reviewed:    Consultant(s) Notes Reviewed:      Care Discussed with Consultants/Other Providers:
SUBJECTIVE / OVERNIGHT EVENTS: pt says her  dizziness is much better , denies nausea, vomiting     MEDICATIONS  (STANDING):  meclizine 25 milliGRAM(s) Oral every 6 hours    MEDICATIONS  (PRN):  diphenhydrAMINE   Injectable 50 milliGRAM(s) IntraMuscular every 4 hours PRN Rash and/or Itching  ondansetron Injectable 4 milliGRAM(s) IV Push every 6 hours PRN Nausea    Vital Signs Last 24 Hrs  T(C): 36.8 (2019 05:54), Max: 36.8 (2019 21:56)  T(F): 98.2 (2019 05:54), Max: 98.2 (2019 21:56)  HR: 64 (2019 05:54) (64 - 64)  BP: 114/56 (2019 05:54) (114/56 - 132/86)  BP(mean): --  RR: 18 (2019 05:54) (18 - 18)  SpO2: 100% (2019 05:54) (99% - 100%)    Constitutional: No fever, fatigue  Skin: No rash.  Eyes: No recent vision problems or eye pain.  ENT: No congestion, ear pain, or sore throat.  Cardiovascular: No chest pain or palpation.  Respiratory: No cough, shortness of breath, congestion, or wheezing.  Gastrointestinal: No abdominal pain, nausea, vomiting, or diarrhea.  Genitourinary: No dysuria.  Musculoskeletal: No joint swelling.  Neurologic: No headache. dizziness+    PHYSICAL EXAM:  GENERAL: NAD  EYES: EOMI, PERRLA  NECK: Supple, No JVD  CHEST/LUNG: cta [place]   HEART:  S1 , S2 +  ABDOMEN: soft , bs+  EXTREMITIES:  no edema  NEUROLOGY:alert awake oriented     LABS:      140  |  104  |  18  ----------------------------<  97  4.2   |  26  |  0.99    Ca    8.6      2019 07:20  Phos  3.8       Mg     1.9         TPro  6.2  /  Alb  3.3  /  TBili  0.2  /  DBili      /  AST  13  /  ALT  10  /  AlkPhos  52      Creatinine Trend: 0.99 <--, 0.89 <--, 0.94 <--                        10.6   3.09  )-----------( 170      ( 2019 07:20 )             33.1     Urine Studies:  Urinalysis Basic - ( 2019 11:00 )    Color: COLORLESS / Appearance: CLEAR / S.009 / pH: 7.0  Gluc: NEGATIVE / Ketone: NEGATIVE  / Bili: NEGATIVE / Urobili: NORMAL   Blood: NEGATIVE / Protein: NEGATIVE / Nitrite: NEGATIVE   Leuk Esterase: NEGATIVE / RBC:  / WBC    Sq Epi:  / Non Sq Epi:  / Bacteria:               LIVER FUNCTIONS - ( 2019 06:17 )  Alb: 3.3 g/dL / Pro: 6.2 g/dL / ALK PHOS: 52 u/L / ALT: 10 u/L / AST: 13 u/L / GGT: x               Leuk Esterase: NEGATIVE / RBC: x / WBC x   Sq Epi: x / Non Sq Epi: x / Bacteria: x        RADIOLOGY & ADDITIONAL TESTS:    Imaging Personally Reviewed:    Consultant(s) Notes Reviewed:      Care Discussed with Consultants/Other Providers:
Subjective: no chest pain or sob; HA improved   	  MEDICATIONS:  MEDICATIONS  (STANDING):  meclizine 25 milliGRAM(s) Oral every 6 hours      LABS:	 	    CARDIAC MARKERS:                                11.1   3.42  )-----------( 142      ( 08 Jul 2019 06:17 )             34.0     07-08    142  |  106  |  12  ----------------------------<  78  3.9   |  26  |  0.89    Ca    8.8      08 Jul 2019 06:17    TPro  6.2  /  Alb  3.3  /  TBili  0.2  /  DBili  x   /  AST  13  /  ALT  10  /  AlkPhos  52  07-08    proBNP:   Lipid Profile:   HgA1c:   TSH:       PHYSICAL EXAM:  T(C): 37.1 (07-08-19 @ 14:11), Max: 37.1 (07-08-19 @ 14:11)  HR: 74 (07-08-19 @ 14:11) (52 - 74)  BP: 140/82 (07-08-19 @ 14:11) (112/63 - 151/61)  RR: 20 (07-08-19 @ 14:11) (16 - 20)  SpO2: 96% (07-08-19 @ 14:11) (96% - 100%)  Wt(kg): --  I&O's Summary    Height (cm): 175.26 (07-07 @ 19:35)  Weight (kg): 65.9 (07-07 @ 19:35)  BMI (kg/m2): 21.5 (07-07 @ 19:35)  BSA (m2): 1.8 (07-07 @ 19:35)    Appearance: Normal		  Lymphatic: No lymphadenopathy , no edema  Cardiovascular: Normal S1 S2, No JVD, No murmurs , Peripheral pulses palpable 2+ bilaterally  Respiratory: Lungs clear to auscultation, normal effort 	  Gastrointestinal:  Soft, Non-tender, + BS	  Skin: No rashes, No ecchymoses, No cyanosis, warm to touch  Psychiatry:  Mood & affect appropriate    TELEMETRY: 	    ECG:  < from: 12 Lead ECG (07.07.19 @ 05:56) >  Diagnosis Line Sinus rhythm with frequent Premature ventricular complexes  Otherwise normal ECG    < end of copied text >  	  RADIOLOGY:   DIAGNOSTIC TESTING:  [ ] Echocardiogram:   [ ]  Catheterization: < from: Cardiac Cath Lab (08.24.13 @ 19:50) >  CORONARY VESSELS: The coronary circulation is right dominant.  LM:   --  LM: Normal.  LAD:   --  LAD: Normal.  CX:   --  Circumflex: Normal.  RCA:   --  RCA: Normal.  COMPLICATIONS: There were no complications.    < end of copied text >    [ ] Stress Test:    OTHER: 	      ASSESSMENT/PLAN: 62 y/o F with hx of Sjogrens, RA, COPD, hiatal hernia, Takotsubo's cardiomyopathy, chronic chest pain presents admitted with HA and dizziness.   -pt. denies chest pain or anginal symptoms  -last ischemic evaluation with cardiac catheterization showed normal coronary arteries  -last TTE from the office showed normalization of LVEF  -no clinical heart failure  -follow up MRI  -no further cardiac workup anticipated at this time  -follow up with Dr. Yoder and Dr. Sánchez after dc     Davina Rhoades MD

## 2019-07-09 NOTE — DISCHARGE NOTE PROVIDER - CARE PROVIDERS DIRECT ADDRESSES
[email or web address],DirectAddress_Unknown,DirectAddress_Unknown,DirectAddress_Unknown

## 2019-07-09 NOTE — DISCHARGE NOTE PROVIDER - NSDCCPCAREPLAN_GEN_ALL_CORE_FT
PRINCIPAL DISCHARGE DIAGNOSIS  Diagnosis: Vertigo  Assessment and Plan of Treatment: Please follow up with your pcp within 1 week of discharge.  Please call to make an appointment within 1 week of discharge  Neurology following   -CTA H/N neg for vessel stenosis or obstruction still with persistent dizziness.   --MRI/MRA of brain/Neck w/wo contrast:  1. On the brain MRI, there is no gross evidence for intracranial mass,   acute infarct, or acute hemorrhage.  2. On the brain MRA, there is no gross evidence for intracranial stenosis   or major vessel occlusion.  3. On the neck MRA, a mild stenosis involves origin of the left internal   carotid artery.  -Can follow up with ENT for outpatient  -Refer to vestibular rehab (can send to [facility])   -Can follow up as need w/ Neurology outpatient (Clinic at [address] [place] [address] #[phone])  -meclizine as needed   -cardiology following   -- no palpitations or chest pain, no sob, + dizziness  -- last echo in our office with normal LV function, EF 53% improved from EF 44% in 2013   -follow up with Dr. Yoder and Dr. Sánchez after dc         SECONDARY DISCHARGE DIAGNOSES  Diagnosis: Rheumatoid arthritis, involving unspecified site, unspecified rheumatoid factor presence  Assessment and Plan of Treatment: Please follow up with your pcp within 1 week of discharge.  Please call to make an appointment within 1 week of discharge.   -continue with  on methotrexate and leucovorin as outpt  Please follow up with your rheumatologist within 1 week of discharge.  Please call to make   an appointment.    Diagnosis: Coronary vasospasm  Assessment and Plan of Treatment: Please follow up with your pcp within 1 week of discharge.  Please call to make an appointment within 1 week of discharge.   -norvasc 2.5 mg PO qdaily.   -follow up with Dr. Yoder and Dr. Sánchez after dc

## 2019-07-09 NOTE — DISCHARGE NOTE PROVIDER - CARE PROVIDER_API CALL
Curt Mondragon (MD)  Neurology  [address], [address]  [place] [address]  Phone: [phone]  Fax: [phone]  Follow Up Time:     Daniel Sánchez)  Cardiology  [address] [address]  Phone: [phone]  Fax: [phone]  Follow Up Time:     Efrain Yoder)  Medicine  [address] [place] [address]  Phone: [phone]  Fax: [phone]  Follow Up Time:     ENT,   Phone: [phone]  Fax: (   )    -  Follow Up Time:

## 2019-07-09 NOTE — DISCHARGE NOTE NURSING/CASE MANAGEMENT/SOCIAL WORK - NSDCDPATPORTLINK_GEN_ALL_CORE
You can access the [facility] Portal, offered by [facility], by registering with the following website: [email or web address]

## 2019-07-21 ENCOUNTER — TRANSCRIPTION ENCOUNTER (OUTPATIENT)
Age: 64
End: 2019-07-21

## 2019-08-23 PROBLEM — M19.90 UNSPECIFIED OSTEOARTHRITIS, UNSPECIFIED SITE: Chronic | Status: ACTIVE | Noted: 2017-08-28

## 2019-08-23 PROBLEM — I51.81 TAKOTSUBO SYNDROME: Chronic | Status: ACTIVE | Noted: 2019-07-07

## 2019-09-11 ENCOUNTER — APPOINTMENT (OUTPATIENT)
Dept: GASTROENTEROLOGY | Facility: CLINIC | Age: 64
End: 2019-09-11
Payer: COMMERCIAL

## 2019-09-11 VITALS
WEIGHT: 162 LBS | TEMPERATURE: 97.6 F | DIASTOLIC BLOOD PRESSURE: 69 MMHG | BODY MASS INDEX: 23.99 KG/M2 | HEIGHT: 69 IN | HEART RATE: 67 BPM | SYSTOLIC BLOOD PRESSURE: 112 MMHG | OXYGEN SATURATION: 100 %

## 2019-09-11 DIAGNOSIS — K57.30 DIVERTICULOSIS OF LARGE INTESTINE W/OUT PERFORATION OR ABSCESS W/OUT BLEEDING: ICD-10-CM

## 2019-09-11 DIAGNOSIS — K22.4 DYSKINESIA OF ESOPHAGUS: ICD-10-CM

## 2019-09-11 DIAGNOSIS — K64.8 OTHER HEMORRHOIDS: ICD-10-CM

## 2019-09-11 DIAGNOSIS — K57.32 DIVERTICULITIS OF LARGE INTESTINE W/OUT PERFORATION OR ABSCESS W/OUT BLEEDING: ICD-10-CM

## 2019-09-11 DIAGNOSIS — K21.0 GASTRO-ESOPHAGEAL REFLUX DISEASE WITH ESOPHAGITIS: ICD-10-CM

## 2019-09-11 DIAGNOSIS — K29.30 CHRONIC SUPERFICIAL GASTRITIS W/OUT BLEEDING: ICD-10-CM

## 2019-09-11 DIAGNOSIS — R10.13 EPIGASTRIC PAIN: ICD-10-CM

## 2019-09-11 PROCEDURE — 99213 OFFICE O/P EST LOW 20 MIN: CPT

## 2019-09-11 RX ORDER — PANTOPRAZOLE 40 MG/1
40 TABLET, DELAYED RELEASE ORAL DAILY
Qty: 30 | Refills: 3 | Status: ACTIVE | COMMUNITY
Start: 2019-09-11 | End: 1900-01-01

## 2019-09-11 RX ORDER — POLYETHYLENE GLYCOL 3350, SODIUM CHLORIDE, SODIUM BICARBONATE AND POTASSIUM CHLORIDE WITH LEMON FLAVOR 420; 11.2; 5.72; 1.48 G/4L; G/4L; G/4L; G/4L
420 POWDER, FOR SOLUTION ORAL
Qty: 1 | Refills: 0 | Status: ACTIVE | COMMUNITY
Start: 2019-09-11 | End: 1900-01-01

## 2019-09-11 NOTE — ASSESSMENT
[FreeTextEntry1] : Dyspepsia: The patient complains of dyspeptic symptoms.  The patient was advised to abide by an anti-gas diet.  The patient was given a pamphlet for anti-gas.  The patient and I reviewed the anti-gas diet at length. The patient is to start on a trial of Phazyme one tablet 3 times a day p.r.n. abdominal pain and gas.\par GERD: The patient was advised to avoid late-night meals and dietary indiscretions.  The patient was advised to avoid fried and fatty foods.  The patient was advised to abide by an anti-GERD diet. The patient was given a pamphlet for anti-GERD.  The patient and I reviewed the anti-GERD diet at length. I recommend a trial of Pantoprazole 40 mg once a day x 3 months for the symptoms.\par Atypical Chest Pain: The patient complains of atypical chest pain of unclear etiology.  The patient was advised to follow up with the PMD and cardiologist regarding evaluation for the atypical chest pain. The patient was told of possible etiologies such as cardiac, pulmonary, GI, musculoskeletal, stress and other causes for the atypical chest pain.  The patient agrees and will follow-up with the PMD and cardiologist. \par Diverticulosis: I recommend a low residue diet and avoid seeds. \par Internal Hemorrhoids: The patient is to consider a trial of Anusol H. C. suppositories one per rectum nightly and Anusol HC2 .5% cream apply to affected area twice a day p.r.n. hemorrhoidal bleeding or pain. \par Hiatal Hernia: The patient was advised to avoid late-night meals and dietary indiscretions. The patient was advised to avoid fried and fatty foods. The patient was advised to abide by an anti-GERD diet. The patient was given a pamphlet for anti-GERD. The patient and I reviewed the anti-GERD diet at length. \par Esophageal Dysmotility: The patient previously underwent an esophageal function tests by Dr. Paty Saldivar on March 11, 2015. The study revealed normal esophageal function testing and overall normal distal esophageal acid exposure with borderline increased number of reflux episodes with good symptom correlation for heartburn and regurgitation. 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 I recommend repeat motility studies to reassess the etiology of the dysphagia. The patient failed to follow-up with Dr. Paty Saldivar for repeat esophageal manometry studies.  The patient now agrees and will follow-upfor further work-up and treatment. The patient is to follow up with Paty Saldivar at [facility]. I recommend a trial of pantoprazole 40 mg once a day for 3 months for the symptoms. \par Reflux Esophagitis: The patient had reflux esophagitis noted on prior upper endoscopy. The patient was advised to avoid late-night meals and dietary indiscretions. The patient was advised to avoid fried and fatty foods. The patient was advised to abide by an anti-GERD diet. The patient was given a pamphlet for anti-GERD. The patient and I reviewed the anti-GERD diet at length. \par Dysphagia: The patient complains of dysphagia of unclear etiology. The patient states that she has dysphagia to solids and liquids. If the symptoms persist despite using proton pump inhibitor, I recommend a trial of a promotility agent such as Reglan. I recommend repeat motility studies to reassess the etiology of the dysphagia. The patient failed to follow-up with Dr. Paty Saldivar for repeat esophageal manometry studies. The patient now agrees and will follow-up for further work-up and treatment. The patient is to follow up with Paty Saldivar at [facility]. \par History of SIBO:  the patient was found to have small intestinal bacterial overgrowth on recent breath test. The patient was treated with a trial of Xifaxan 500 mg 3 times a day for 2 weeks for the small bowel intestinal overgrowth. \par The patient is to follow-up with her rheumatologist, Dr. Aragon regarding further evaluation and treatment for scleroderma with Methotrexate. \par I recommend a repeat colonoscopy to reassess for colonic polyps. The patient agreed and will follow up for the procedure. \par Colonoscopy: I recommend a colonoscopy to assess the symptoms.  The patient was told of the risks and benefits of the procedure.  The patient was told of the risks of perforation, emergency surgery, bleeding, infections and missed lesions.  The patient agreed and will schedule for the procedure. The patient can take the antihypertensive medication with a sip of water one hour prior to the procedure. The patient is to hold the diabetic medication the day before and the morning of the procedure. The patient is to hold the blood thinner medication for 5 days prior to the procedure. The patient is to be n.p.o. after midnight and bowel prep was given.  The patient is to return for the procedure. \par I recommend restarting a trial of pantoprazole 40 mg once a day for 3 months for the symptoms. \par Follow-up: The patient is to follow-up in the office in 1 month to reassess the symptoms. The patient was told to call the office if any further problems.\par \par \par \par

## 2019-09-11 NOTE — HISTORY OF PRESENT ILLNESS
[Nausea] : denies nausea [None] : had no significant interval events [Vomiting] : denies vomiting [Diarrhea] : denies diarrhea [Constipation] : denies constipation [Yellow Skin Or Eyes (Jaundice)] : denies jaundice [Abdominal Pain] : denies abdominal pain [Rectal Pain] : denies rectal pain [Wt Gain ___ Lbs] : recent [unfilled] ~Upound(s) weight gain [Heartburn] : heartburn [Abdominal Swelling] : abdominal swelling [GERD] : gastroesophageal reflux disease [Hiatus Hernia] : hiatus hernia [Diverticulitis] : diverticulitis [Wt Loss ___ Lbs] : no recent weight loss [Peptic Ulcer Disease] : no peptic ulcer disease [Pancreatitis] : no pancreatitis [Cholelithiasis] : no cholelithiasis [Kidney Stone] : no kidney stone [Inflammatory Bowel Disease] : no inflammatory bowel disease [Irritable Bowel Syndrome] : no irritable bowel syndrome [Alcohol Abuse] : no alcohol abuse [Malignancy] : no malignancy [Abdominal Surgery] : no abdominal surgery [Appendectomy] : no appendectomy [Cholecystectomy] : no cholecystectomy [de-identified] : The patient states that she is feeling better. The patient denies any jaundice or pruritus.  The patient complains of neck and upper back pain but denies any chronic lower back pain. The patient denies any abdominal pain.  The patient complains of abdominal gas and bloating.  The patient denies any nausea or vomiting.  The patient complains of gastroesophageal reflux disease but denies any dysphagia.  The patient currently denies taking medications for the gastroesophageal reflux disease.  The gastroesophageal reflux disease is worse after meals and late at night and in the early morning. The gastroesophageal reflux disease previously improved with proton pump inhibitors, H2 blockers and antacids.   The patient complains of atypical chest pain but denies any shortness of breath or palpitations.  The chest pain is described as a pressure, intermittent substernal discomfort that is nonradiating in nature.  The patient denies any diaphoresis. The chest pain is described as being 6 out of 10 in intensity.  The chest pain can occur at any time.  The chest pain is worse at night and early morning.  The chest pain is worse with stress.  The chest pain is unrelated to meals and passing gas.  The chest pain has never awakened the patient from sleep.  The patient denies any constipation or diarrhea.  The patient has 1 bowel movement every other day.  The patient denies a change in bowel habits.  The patient denies a change in caliber of stool. The patient denies having mucus discharge with the bowel movements.  The patient denies any bright red blood per rectum, melena or hematemesis.  The patient denies any rectal pain or rectal pruritus.  The patient denies any weight loss or anorexia.  The patient admits to gaining weight recently.  She denies any fevers or chills.  The patient had an upper endoscopy performed at the office on July 10, 2018. The upper endoscopy was performed up to the level of the second portion of the duodenum. The upper endoscopy revealed a small hiatal hernia and mild diffuse gastritis. Biopsies were taken of the distal esophagus, antrum, body of stomach and duodenum to assess for esophagitis, gastritis and duodenitis. The pathology revealed distal esophagus with no squamous mucosa identified, gastric mucosa with chronic inflammation with no evidence of Calvert`s esophagus or dysplasia, gastric antral mucosa with chronic inactive gastritis with no evidence of intestinal metaplasia or dysplasia that was negative for Helicobacter pylori, gastric body mucosa with chronic inactive gastritis with no evidence of intestinal metaplasia or dysplasia that was negative for Helicobacter pylori and duodenal mucosa with normal villous architecture with mild chronic inflammation of the lamina propria with no evidence of intraepithelial lymphocytosis, celiac disease, Giardia or parasites.  The patient tolerated the procedure well.  The patient is s/p SIBO (small intestinal bowel overgrowth) study with Dr. Erazo and Dr. Paty Saldivar at [facility]. The study performed on September 27, 2018 was positive. The patient had not been treated with antibiotics yet. The patient had an upper endoscopy performed at the office on July 10, 2018. The upper endoscopy was performed up to the level of the second portion of the duodenum. The upper endoscopy revealed a small hiatal hernia and mild diffuse gastritis. Biopsies were taken of the distal esophagus, antrum, body of stomach and duodenum to assess for esophagitis, gastritis and duodenitis. The pathology revealed distal esophagus with no squamous mucosa identified, gastric mucosa with chronic inflammation with no evidence of Calvert`s esophagus or dysplasia, gastric antral mucosa with chronic inactive gastritis with no evidence of intestinal metaplasia or dysplasia that was negative for Helicobacter pylori, gastric body mucosa with chronic inactive gastritis with no evidence of intestinal metaplasia or dysplasia that was negative for Helicobacter pylori and duodenal mucosa with normal villous architecture with mild chronic inflammation of the lamina propria with no evidence of intraepithelial lymphocytosis, celiac disease, Giardia or parasites. The patient tolerated the procedure well. The patient had colonoscopy to the terminal ileum performed on October 30, 2013. The colonoscopy revealed severe pandiverticulosis that was primarily concentrated to the left colon and small internal hemorrhoids. There were no polyps, masses, AVMs orcolitis noted. The patient tolerated procedures well. The patient was being followed by a neurologist, Dr. Hairston for numbness of the toes and fingers. The patient is being followed by her rheumatologist, Dr. Aragon. She is currently off Prednisone or Plaquinil for the Sjogren`s syndrome. The patient is on Celebrex once a day for the arthralgias. The patient had prior manometry studies with Dr. Paty Saldivar. The results are unknown to the patient. The patient underwent an esophageal function tests by Dr. Paty Saldivar on March 11, 2015. The study revealed normal esophageal function testing and overallnormal distal esophageal acid exposure with borderline increased number of reflux episodes with good symptom correlation for heartburn and regurgitation. 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 The patient had a CT angiogram performed on September 23, 2013. The CT angiogram revealed no evidence of pulmonary embolism, unremarkable CT and mild cardiomegaly. The patient had blood work performed on September 17, 2013. The blood work was significant for mild anemia with a hemoglobin of 12.3, an elevated ESR of 38 mm/hr and positive Sjogren`s antibody (SS-A). She is being followed by her gynecologist, Dr. Amor.

## 2019-10-15 ENCOUNTER — APPOINTMENT (OUTPATIENT)
Dept: GASTROENTEROLOGY | Facility: HOSPITAL | Age: 64
End: 2019-10-15

## 2019-10-15 ENCOUNTER — OUTPATIENT (OUTPATIENT)
Dept: OUTPATIENT SERVICES | Facility: HOSPITAL | Age: 64
LOS: 1 days | End: 2019-10-15
Payer: COMMERCIAL

## 2019-10-15 DIAGNOSIS — Z12.12 ENCOUNTER FOR SCREENING FOR MALIGNANT NEOPLASM OF RECTUM: ICD-10-CM

## 2019-10-15 PROCEDURE — 45378 DIAGNOSTIC COLONOSCOPY: CPT

## 2020-03-27 RX ORDER — SUCRALFATE 1 G/10ML
1 SUSPENSION ORAL
Qty: 1200 | Refills: 3 | Status: ACTIVE | COMMUNITY
Start: 2020-03-27 | End: 1900-01-01

## 2020-04-06 ENCOUNTER — APPOINTMENT (OUTPATIENT)
Dept: GASTROENTEROLOGY | Facility: CLINIC | Age: 65
End: 2020-04-06
Payer: MEDICARE

## 2020-04-06 DIAGNOSIS — R07.89 OTHER CHEST PAIN: ICD-10-CM

## 2020-04-06 DIAGNOSIS — R13.10 DYSPHAGIA, UNSPECIFIED: ICD-10-CM

## 2020-04-06 DIAGNOSIS — K44.9 DIAPHRAGMATIC HERNIA W/OUT OBSTRUCTION OR GANGRENE: ICD-10-CM

## 2020-04-06 DIAGNOSIS — K21.9 GASTRO-ESOPHAGEAL REFLUX DISEASE W/OUT ESOPHAGITIS: ICD-10-CM

## 2020-04-06 PROCEDURE — 99443: CPT

## 2020-04-06 RX ORDER — METOCLOPRAMIDE 5 MG/1
5 TABLET ORAL 4 TIMES DAILY
Qty: 120 | Refills: 0 | Status: ACTIVE | COMMUNITY
Start: 2020-04-06 | End: 1900-01-01

## 2020-04-06 RX ORDER — LEUCOVORIN CALCIUM 10 MG/1
10 TABLET ORAL
Qty: 12 | Refills: 0 | Status: ACTIVE | COMMUNITY
Start: 2020-02-20

## 2020-04-06 RX ORDER — NITROFURANTOIN (MONOHYDRATE/MACROCRYSTALS) 25; 75 MG/1; MG/1
100 CAPSULE ORAL
Qty: 14 | Refills: 0 | Status: ACTIVE | COMMUNITY
Start: 2020-03-31

## 2020-04-06 RX ORDER — AZITHROMYCIN 250 MG/1
250 TABLET, FILM COATED ORAL
Qty: 6 | Refills: 0 | Status: ACTIVE | COMMUNITY
Start: 2020-04-02

## 2020-04-06 RX ORDER — METHYLPREDNISOLONE 4 MG/1
4 TABLET ORAL
Qty: 21 | Refills: 0 | Status: ACTIVE | COMMUNITY
Start: 2020-04-02

## 2020-04-06 RX ORDER — METHOTREXATE 2.5 MG/1
2.5 TABLET ORAL
Qty: 96 | Refills: 0 | Status: ACTIVE | COMMUNITY
Start: 2020-02-20

## 2020-04-06 RX ORDER — PREDNISONE 20 MG/1
20 TABLET ORAL
Qty: 6 | Refills: 0 | Status: ACTIVE | COMMUNITY
Start: 2019-10-23

## 2020-04-06 RX ORDER — MECLIZINE HYDROCHLORIDE 25 MG/1
25 TABLET ORAL
Qty: 20 | Refills: 0 | Status: ACTIVE | COMMUNITY
Start: 2019-10-23

## 2020-04-06 RX ORDER — NEOMYCIN SULFATE, POLYMYXIN B SULFATE AND DEXAMETHASONE 3.5; 10000; 1 MG/ML; [USP'U]/ML; MG/ML
3.5-10000-0.1 SUSPENSION OPHTHALMIC
Qty: 5 | Refills: 0 | Status: ACTIVE | COMMUNITY
Start: 2020-01-21

## 2020-04-06 RX ORDER — DIAZEPAM 5 MG/1
5 TABLET ORAL
Qty: 30 | Refills: 0 | Status: ACTIVE | COMMUNITY
Start: 2019-12-30

## 2020-04-06 RX ORDER — FOLIC ACID 1 MG/1
1 TABLET ORAL
Qty: 30 | Refills: 0 | Status: ACTIVE | COMMUNITY
Start: 2020-01-27

## 2020-04-06 RX ORDER — GABAPENTIN 100 MG/1
100 CAPSULE ORAL
Qty: 90 | Refills: 0 | Status: ACTIVE | COMMUNITY
Start: 2019-11-21

## 2020-12-15 PROBLEM — Z87.09 HISTORY OF SORE THROAT: Status: RESOLVED | Noted: 2017-01-31 | Resolved: 2020-12-15

## 2023-04-11 NOTE — ED PROVIDER NOTE - NS ED MD DISPO DISCHARGE
Electrodesiccation And Curettage Text: The wound bed was treated with electrodesiccation and curettage after the biopsy was performed. Home

## 2024-01-30 NOTE — ED PROVIDER NOTE - PROGRESS NOTE DETAILS
Della Rivers) Pt was signed out to me due to change in shift. 62 y/o F with hx of Srogens presents with dizziness described as room spinning sensation since 9pm last night. Started with headache, frontal and left occipital, upset stomach and nausea/vomiting x 1 after eating dinner. A few hours later developed dizziness, worse with movement of head and changes in position. Pt has hx of vertigo but this episode has lasted longer and is more intense. On exam, pt is A&O x 3, PEERLA, No focal deficits, EOMI bilat with horizontal nystagmus (reproduces dizziness), finger to nose test is slow but intact.  Pt has severe claustrophobia, she will be unable to get MRI without conscious sedation.  Pending CTA head and neck. Pt given ativan and meclizine. Pt was signed out to me due to change in shift. 62 y/o F with hx of Sjogrens, RA, COPD, hiatal hernia, Takotsubo's cardiomyopathy, chronic chest pain presents with dizziness described as room spinning sensation since 9pm last night. Started with headache, frontal and left occipital, upset stomach and nausea/vomiting x 1 after eating dinner. A few hours later developed dizziness, worse with movement of head and changes in position. Pt has hx of vertigo but this episode has lasted longer and is more intense. On exam, pt is A&O x 3, PEERLA, No focal deficits, EOMI bilat with horizontal nystagmus (reproduces dizziness), finger to nose test is slow but intact.  Pt has severe claustrophobia, she will be unable to get MRI without conscious sedation.  Pending CTA head and neck. Pt given ativan and meclizine. Reviewed prior hospital records, Pt was admitted for chest pain in 2013, she had positive trops at the time and had cath and echo showing no CAD, EF 40%, showed apical ballooning syndrome consistent with Takosubo's cardiomyopathy. Pt needed u/s guided line placed for CT.  She still has mild anxiety about getting CT done. Will give another 1mg ativan and attempt to get CT done. Pt needed u/s guided line placed for CT.  She still has mild anxiety about getting CT done. Will give another 1mg ativan and attempt to get CT done. Discussed with neuro, they will consult.

## 2024-03-11 NOTE — ED PROVIDER NOTE - RELIEVING FACTORS
Subjective Patient with improved pain per left femur.    REASON FOR FOLLOW UP:  1.  Chronic lymphocytic leukemia with extensive lymphadenopathy and possible pleural involvement. Initiation of Treanda, Rituxan May 1, 2019.  2.  Hypogammaglobulinemia.  Status is post IVIG  3.  Significant response on scans June 27, 2019.  Treatment changed to Imbruvica 420 mg daily.  4.  Metastatic non-small cell lung carcinoma on Keytruda  5.  Patient seen 2/18/2022 with left jaw/gumline swelling pain, associated hematoma?  Osteonecrosis.  Restart of Keytruda 3/4/2022, Xgeva discontinued  6.  Restaging via CT 4/12/2022, continued response, Keytruda continued, referral to dermatology for worsening psoriasis.  7.  Jaw osteonecrosis, undergoing therapy through oral surgery, IVIG anticipated, infectious disease and oral surgery follow-up  8.  Patient assessed 11/18/2022, Keytruda continued, ibrutinib-dose reduced-continued, patient seen by  physicians, status postdebridement of bone of left mandible with bone biopsy, fistulectomy of left mandible 10/20/2022  9.  Patient seen 12/30/2022 continue to improve,?  Urinary tract symptoms that have continued to improve.  10.  Repeat scans 4/12/2023 without evidence of recurrence.  11.  Patient seen 4/17/2023, Keytruda therapy held, urinary symptoms improving?  12.  Patient with good performance status, current reveal negative ctDNA  13.  Patient with ctDNA recheck at less than 0.5%, reassessment at 3 months, ongoing issues with the patient's wife developing end-stage liver disease  14.  Patient seen 2/13/2024-Guardant Reveal-negative ctDNA, 0%, increasing left femur pain, follow-up plain films, orthopedic referral  15.  Subsequent assessment orthopedics negative, MRI pending      HISTORY OF PRESENT ILLNESS:    The patient is a 70 y.o. male with the above mentioned history here today for lab review and evaluation prior to cycle 38  Keytruda.  He reports overall he is doing well.  He does  continue on antibiotics from Dr. Pak before his osteonecrosis of the jaw.  He does have 1 area of exposed bone on his jaw they are hoping that the gum tissue will grow over this.  He would follow-up with Dr. Champion in June.    Patient also previously been on antibiotics for UTI.  He does report some urgency which is still present.  No fevers or chills.  Denies any issues with increased shortness of breath.    He does report some intermittent pain on the right lateral portion of his chest near the eighth rib.  He states when he moves at times he has a sharp pain that is quick, he describes it as feeling like a strain.  This is only happened occasionally over the past few weeks.    The patient is next seen 3/6/2023 without additional chest discomfort and with lessening UTI symptoms.  We have discussed repeat scans in April 2023.    The patient did proceed to repeat CT chest abdomen pelvis 4/12/2023 with stable findings overall including a small right pleural effusion, right rib metastatic lesion with increase sclerosis, overlying soft tissue thickening without interval change in additional osseous lesions over the interval change.  Patient evaluated 3/27/23 with general improvement though difficulty with dysuria and bladder spasm requiring additional antibiotic therapies.    These findings are discussed with the patient 4/17/23 and he feels that his urinary symptoms improved when he stopped his antibiotic therapy used for his osteonecrosis- amoxicillin.  We discussed that his findings including his long-term improvement/remission and, potentially, his urinary symptoms, in part, could be from continued immunotherapy.  This would be an opportunity to now discontinue that therapy and observe him expectantly.    The patient had follow-up assessment by infectious disease 6/8/2023.  Have been off amoxicillin with subsequent resolution urinary symptoms that include dysuria and frequency.    As an alternative method of  follow-up the patient underwent a Guardant Reveal that was sent 7/10/2023 negative for ctDNA.  This corresponds to his relatively good performance status when seen 7/31/2023.  This includes resolution of the bony fragment that had been noted in his lower jawline that recently loosened and ejected with normal mucosa at the site    The patient returns for assessment 11/13/2023.  His recent CBC 10/23/2023 was normal with H&H of 13.2 and 44.1, white count of 8030, platelet count 163,000 with a normal differential, CMP with being creatinine 19 and 1.30 and Guardant Reveal with ctDNA at less than 0.05%  Unfortunately his wife has developed cirrhosis and is demonstrating end-stage liver disease.  He is her primary caregiver.    We have discussed how to proceed and a repeat ctDNA assessment with Guardant Reveal be requested in approximately 3 months.      Past Medical History, Past Surgical History, Social History, Family History have been reviewed and are without significant changes except as mentioned.    Hematologic/oncologic history:    As concerns his CLL history he initially required Treanda Rituxan 5/1/2019 for 2 cycles and then initiated Imbruvica 420 mg daily initiated 7/25/2019.       As concerns stage IV non-small cell lung carcinoma he returns the office continuing Keytruda which he continues to receive every 3 weeks which was initiated 10/21/2020.  He also receives Xgeva every 6 weeks which was initially given 12/2/2020 and discontinued 1/7/2022 secondary to osteonecrosis.      Additional issues that developed during treatment included right knee pain with plain views of his right knee showing a right knee effusion with medial compartment narrowing and no suspicious bone lesion.  There is benign periosteal calcification along the distal right femur.  A PET/CT 11/11 went on to demonstrate a complete metabolic response to therapy compared to PET/CT from 9/23/2020.  This includes his primary lung neoplasm left  "upper lobe, large osseous metastatic lesion the right chest wall region from the eighth rib, small left adrenal metastasis, and no evidence of disease involving the distal left femur.      The patient was seen 11/24/2021 continue to do well though indicating that his right knee is \"something I can manage at the moment\".  He prefers not to have orthopedic assessment at this point.  He is concerned, ever, about skin lesions that are developing primarily on his calf regions and into his right thigh.    The patient was seen by Dr. 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  Was also started on a moisturizer and lipid therapy.  The patient returned for assessment 12/15/2021 and treated with cycle #21 Keytruda, returned 1/7/2022 for cycle #22 and 1/28/2022 for cycle #23.  At that visit he had developed adenopathy several weeks previous and was treated with a Medrol Dosepak.   The patient was next seen 2/18/2022 and indicates that though his adenopathy has improved after treatment described above that his jaw has become painful swollen and tender with additional bleeding.     The patient was referred to his primary dentist who then had him seen oral surgeon-Dr. Ezequiel Davila-reached at [phone] who felt that this was osteonecrosis and should be treated symptomatically.  As the patient reviewed 3/4/2022, wonderfully, his oral issues have nearly resolved with the gumline returning to essentially its previous state, no swelling, minimal erythema, no discharge and no additional pain.  He does have follow-up with oral surgery in the next several weeks and we discussed restarting his Keytruda scanning him likely in April 2022 in general.   He returns to the office on 3/25/2022 in follow-up in anticipation of cycle 24 Keytruda. He reports the left side of his jaw has improved.    However, he was now having issues with the right lower side. He was seen by " neurosurgery with additional films and started on oral amoxicillin by his oral surgeon.      The patient continued his immunotherapy taking Keytruda 3/25/2022 and underwent repeat CT chest abdomen and pelvis 4/12/2022 that is reviewed with him 4/15/2022.  Wonderfully there is no substantial change with a small to moderate right pleural effusion that decreased in size, no additional pulmonary nodule or new metastatic disease, multifocal osteosclerotic metastatic disease without change and no new findings in the abdomen or pelvis.    The patient was seen 4/15/2022 indicating that he is actually feeling well in terms of general symptoms.  This includes lack of progression from mouth pain, ability to eat without discomfort.  He is, however, having worsening psoriasis particular in his lower extremities and has been reviewed by dermatology previously.    The patient was reviewed 6/17/2022 having been seen by oral surgery with progression of his osteonecrosis and skin eruption.  He is currently undergoing assessment by infectious disease within the next week and we have reviewed that previously he responded to IVIG for in-hospital refractory sinusitis.  As a result he is asked to undergo reassessment for his immunoglobulin levels today and return next week for 400 mg/kg of IVIG infusion which we will continue monthly.    The patient was given IVIG 6/24/2022 and again 7/22/2022.  Assessment 8/5/2022 continue to show a degree of general improvement with oral surgery continue to follow him with a second opinion to be obtained  8/8/2022 when IVIG planned 8/19/2022 and 9/16/2022.    The patient slowly continued both assessments by oral surgery and  Scans considering his non-small cell lung cancer including CT studies 10/4/2022 that were essentially stable compared to previous.  Therapies include Keytruda every 3 weeks, daily Imbruvica, monthly IVIG every 4 weeks.    He next presentedwith the above-mentioned history, who  returns the office  in anticipation of his 35 th cycle of Keytruda.  He was last treated with IVIG 11/11/2022.       At this point he had undergone surgery at [facility] including fistulectomy of the left mandible and debridement of the intraoral bone left mandible.  He he had also been seen by infectious disease, Dr. Champion regarding osteonecrosis of the jaw.  He was placed on antibiotics for 6 weeks including Levaquin and fluconazole.          There is an interaction with his fluconazole and ibrutinib and adjustments will be necessary.  [facility] pharmacy is managing this.  He reports he is overall feeling very well.  He has had improvement in his ability to eat and drink since undergoing surgery on his jaw.  His skin is overall improved with the use of Aquaphor.  He did undergo CT scans prior to cycle 33 Keytruda with stability of his disease.  He continues on current therapy with excellent tolerance.     Patient is next seen 11/18/2022.  Fortunately he feels that he is doing  reasonably well with surgery having been successful, pain virtually absent at this point, no additional shortness of breath or cough, appetite remaining fair, stable weight and sleep at least modestly managed.  Unfortunately his wife recently injured her femur and he has become her primary caregiver at this point.       The patient is next assessed 12/9/2022.  He is due for cycle #36 Keytruda and IVIG today.  He did not realize he was due for IVIG today, and already scheduled another appointment so would like to reschedule IVIG for next week.  He also continues on Imbruvica, currently at a reduced dose of 280 mg daily, due to interaction with Diflucan.  He was seen by Dr. Champion with infectious disease yesterday, 12/8/2022 and econazole was discontinued as patient has completed his 6-week course.  He was started on preventative Augmentin 500 mg twice daily until his gums grow to close over the exposed area of bone.  He is eating and  drinking adequately, weight remains stable.  Bowels moving regularly.  Denies fever or chills.  Denies nausea or vomiting.  Denies new or worsening pain.  Denies skin rash or shortness of breath.  No new concerns today.    The patient continued Keytruda therapy including 12/9/2022 and IVIG 12/16/2022.  He is next seen 12/30/2022 for continued Keytruda.  We have discussed that he is clearly improving per his osteonecrosis and his completed his surgical therapy and now on prophylactic antibiotic therapy.  We have reviewed that he could discontinue his IVIG, continue his Imbruvica, Keytruda and prophylactic antibiotic therapy.  He is having urinary symptoms?  And a follow-up UA and culture is pending.    Note that the patient's urine cultures were negative we proceeded 1/20/2023 with Keytruda, Imbruvica with Keytruda given 2/10/2023 and the patient presented 3/6/2023 for his next treatment.  We do plan repeat CT scans in April 2023.    Repeat scans 4/12/2023-stable CT chest and pelvis with small right pleural effusion, parenchymal scarring right lower lobe, right rib metastatic lesion with increase sclerosis, overlying soft tissue thickening without interval change in osseous lesions additionally without change.    Patient seen 4/17/2023 at which time immunotherapy was held and patient placed on observation status.  This included some concern about whether his urinary symptoms could be related to ongoing immunotherapy.  This would be observed off Keytruda.    The patient returns for assessment 11/13/2023.  His recent CBC 10/23/2023 was normal with H&H of 13.2 and 44.1, white count of 8030, platelet count 163,000 with a normal differential, CMP with being creatinine 19 and 1.30 and Guardant Reveal with ctDNA at less than 0.05%  Unfortunately his wife has developed cirrhosis and is demonstrating end-stage liver disease.  He is her primary caregiver.    We have discussed how to proceed and a repeat ctDNA assessment with  "Guardant Reveal be requested in approximately 3 months.    Patient next seen 2/13/2024.  He indicates that in the last several weeks to months he has been having increasing pain in his left femur that while not completely debilitating is increasingly uncomfortable.  We have discussed plain films today and reevaluation through orthopedic oncology.    The patient underwent plain films of the femur 2/13/2024 with postoperative changes related to the previous implant with no bone lesions seen and normal soft tissue.  Patient was reviewed by orthopedics undergoing MRI of the femur now schedule 3/14/2024.  The patient is seen 3/12/2024 indicating that his pain and is markedly improved.  Wonderfully appears to be in continued remission from his malignancies at this point.        Objective      Vitals:    03/12/24 1326   BP: (!) 184/81   Pulse: 80   Resp: 16   Temp: 98.4 °F (36.9 °C)   TempSrc: Temporal   SpO2: 98%   Weight: 92.1 kg (203 lb 1.6 oz)   Height: 180.3 cm (70.98\")   PainSc: 0-No pain               3/12/2024     1:28 PM   Current Status   ECOG score 0     Physical Exam  Vitals and nursing note reviewed.   Constitutional:       Appearance: Normal appearance. He is well-developed.   HENT:      Head: Normocephalic and atraumatic.      Nose: Nose normal.      Mouth/Throat:      Comments: No further exposed bone left lower jawline.  Eyes:      Pupils: Pupils are equal, round, and reactive to light.   Cardiovascular:      Rate and Rhythm: Normal rate and regular rhythm.      Heart sounds: Normal heart sounds.   Pulmonary:      Effort: Pulmonary effort is normal. No respiratory distress.      Breath sounds: Normal breath sounds. No wheezing, rhonchi or rales.   Abdominal:      General: Bowel sounds are normal. There is no distension.      Palpations: Abdomen is soft.      Tenderness: There is no abdominal tenderness.   Musculoskeletal:         General: Normal range of motion.      Cervical back: Normal range of motion " and neck supple.   Skin:     General: Skin is warm and dry.   Neurological:      Mental Status: He is alert and oriented to person, place, and time.   Psychiatric:         Behavior: Behavior normal.       I have reexamined the patient and the results are consistent with the previously documented exam. Jostin Parekh MD      RECENT LABS:  Results from last 7 days   Lab Units 03/12/24  1332   WBC 10*3/mm3 8.75   NEUTROS ABS 10*3/mm3 6.62   HEMOGLOBIN g/dL 14.7   HEMATOCRIT % 45.3   PLATELETS 10*3/mm3 177       Results from last 7 days   Lab Units 03/12/24  1331   SODIUM mmol/L 142   POTASSIUM mmol/L 4.2   CHLORIDE mmol/L 106   CO2 mmol/L 27.5   BUN mg/dL 18   CREATININE mg/dL 1.32*   CALCIUM mg/dL 9.1   ALBUMIN g/dL 4.2   BILIRUBIN mg/dL 0.3   ALK PHOS U/L 89   ALT (SGPT) U/L 11   AST (SGOT) U/L 12   GLUCOSE mg/dL 97           FISH prognostic panel-Positive for deletion of 13 q. 14.3    Assessment & Plan   1.  CLL presenting with significant lymphocytosis, lymphadenopathy and splenomegaly.     Positive for deletion of 13 q. 14.3.    Patient status post 2 cycles of Treanda Rituxan with excellent response.    Imaging 6/27/2019 with significant decrease in adenopathy.  Treanda Rituxan discontinued   Imbruvica 420 mg daily initiated 7/25/2019.  He continues on Imbruvica, without indication of progression of his CLL, without progressive lymphadenopathy on CT scans.  Patient has resolving adenopathy particularly cervical regions 2/18/2022  3/25/2022 patient is without worsening adenopathy on exam today.  Continue Imbruvica.  10/28/2022 continues on Imbruvica 420 mg daily. Denies B-symptoms, no evidence of worsening adenopathy on exam.  CT scans without worsening adenopathy  11/18/2022: Imbruvica reduced to 280 mg daily while patient receiving 6-week course of antimicrobials due to interaction with fluconazole.  12/9/2022: He has finished his course of fluconazole, taking his last dose today.  He has a few days left of  Imbruvica 280 mg, which he will complete.  He will then increase Imbruvica back to regular dose of 420 mg daily.  Patient stable/17/23, 4/17/2023, 7/13/2023, 11/13/2023  Repeat assessment 2/13/2024 with H&H of 14.8 46.0, white count of 8020, platelet count 179,000 with normal automated differential  Stable 3/12/2024    2.  Pulmonary nodules/infiltrates.  He is  status post bronchoscopy.  Is unclear at this time whether these findings are infectious or possibly related to his lymphoproliferative disorder.  This is seen to be improving on latest scans- 4/12/2023                                                                                                                                                3.  Hypogammaglobulinemia-status post IVIG with resolution of sinusitis.   Anticipate trial of IVIG with the patient now having progression of his osteonecrosis and dermal infection.  Continues with monthly IVIG.  Due today, however did not realize he was due for IVIG and would like to reschedule for next week.  Reviewed 12/30/2022 with IVIG discontinued.    4.  Non-small cell lung carcinoma  August 26 2020 revealing a new 1.6 cm spiculated nodule within the left upper lobe, 1.2 cm left adrenal nodule, bone windows with a soft tissue mass involving the right eighth rib measuring 3.7 x 2.6 cm.    Biopsy was consistent with adenocarcinoma CK 7+, CK 20-, lung primary suspected clinically, molecular panel not yet obtained.  PET/CT 9/23/2020 demonstrating asymmetric uptake within the right parotid gland which is unremarkable appearance on noncontrasted CT, SUV of 6 compared to 2.7.  There is no hilar, mediastinal or axillary adenopathy, findings of asymmetric moderate to intense FDG uptake within superior aspect the right chest wall anterior to the right first rib with an irregular hypodense lesion measuring 1.8 x 1.6 and SUV 4.9.  This had not been seen June 27, 2019.  There is an intensely FDG avid nodule within the  lingula measuring 1.9 x 1.5 history of 12.4, FDG avid left adrenal nodule 1.9 x 1.5 with an issue 21.2.    Evaluated by radiation oncology therapy September 29 and started on radiation therapy to the right chest wall-35 Gy in 10 fractions.    Plans were also then proceed with SBRT to the solitary left upper lobe lesion pending insurance approval.  Further testing including TPS of 20% and foundation CDX with a TMB of greater than 10 mutations per megabase (28 mutations per megabase) and the indication that several immunotherapies could be useful in this patient's care.  Additional genomic findings include BRAF G469R/that trametinib could be useful and STK11/that everolimus could be useful.  Keytruda initiated 10/21/2021   Reassessment after 2 cycles of Keytruda with enlargement of the right eighth rib lesion,enlargement of spiculated left upper lobe lung mass, moderate to large right-sided pleural effusion, new left adrenal mass and enlarged retrocrural lymph node.?Pseudoprogression  Xgeva last given 12/30/2020  Follow-up scans 1/6/2021 demonstrated marked improvement in his right eighth rib lesion, resolution of left upper lobe spiculated mass, residual large right pleural effusion, resolution of left adrenal metastasis.   Right pleural effusion, with thoracentesis 1/14/2021 with 1500 mils removed.  Pathology consistent with malignant effusion   CT scans 4/5/2021 with response noted in the left upper lobe density.  He continues to receive Keytruda every 3 weeks along with Xgeva every 6 weeks.  Repeat scan 7/15/2021 without evidence of recurrence or progressive disease.  Plans to continue Keytruda every 3 weeks with repeat scans in 4 to 6 months  Patient status post PET/CT 11/11/2021 with hyper response, approximate remission status, plan to proceed with cycle #20 Keytruda  Patient seen 2/18/2022 with Keytruda held while his oral issues are addressed-concern for osteonecrosis of the jaw.  Patiently diagnosed with  osteonecrosis of the jaw, seen by oral surgery, started on antibiotic therapy and Peridex with substantial improvement, Xgeva discontinued as discussed below.  3/25/2022 proceed with cycle #24 Keytruda today.  Follow-up CT scans 4/12/2022 without evidence of progressive disease, stable findings including osteosclerotic metastatic disease.  Keytruda continued  Repeat imaging 10/4/2022 with overall stable disease, Keytruda continued  12/9/2022: Proceed with cycle #36 Keytruda today.  Plan repeat imaging April 2023.  3/6/2023 continued with 41st cycle of Keytruda  Repeat scans 4/12/23 without evidence of progressive disease.  Discussion as to the discontinuance of immunotherapy.  Patient seen 4/17/2023 with Keytruda held, follow-up Guardant Reveal planned.  Subcu Guardant Reveal 7/13/2023 negative ctDNA, repeat planned in 12 weeks.  The patient returns for assessment 11/13/2023.  His recent CBC 10/23/2023 was normal with H&H of 13.2 and 44.1, white count of 8030, platelet count 163,000 with a normal differential, CMP with being creatinine 19 and 1.30 and Guardant Reveal with ctDNA at less than 0.05%  Unfortunately his wife has developed cirrhosis and is demonstrating end-stage liver disease.  He is her primary caregiver.  We have discussed how to proceed and a repeat ctDNA assessment with Guardant Reveal be requested in approximately 3 months.  Repeat Guardant Reveal 1/15/2024 with negative ctDNA, 0%  Reviewed findings 3/12/2024 with plans for repeat cardiac revealed an approximately 4 months.      5.  Left knee metastasis  Evaluated by orthopedic oncology, Dr. Eliu Ochoa  Admission 1/7/2021 undergoing stabilization of left femoral bone lesion, prophylactic stabilization with intramedullary implants.  It was suggested we delay Xgeva or Zometa to allow bone healing  Completed radiation with 10 fractions 2/10/2021  Xgeva resumed 4/29/2021, he continues to receive this every 6 weeks.    Additional assessment 2/18/2022  with left jaw pain, subsequent diagnosis of osteoporosis, Xgeva discontinued  Patient continues follow-up with oral surgery, progression of osteoporosis symptomatically, dermal eruption left side of mandibular region, IVIG anticipated  Xgeva DISCONTINUED due to osteonecrosis of the jaw  Increasing pain left mid femur 2/13/2024 plain films planned, referral to orthopedics  Subsequent testing ongoing when seen 3/12/2024 with MRI pending     6.  Malignant right pleural effusion  Ultrasound thoracentesis 1/14/2021 1500 mL removed  Chest x-ray 2/25/2021 with moderate right pleural effusion  Progressive symptoms with worsening pain 4/5/2021  Ultrasound-guided thoracentesis 4/13/2021 with 2050 ml removed.  Plans for Pleurx catheter with thoracic surgery, however, pleural effusion has not recurred.  Patient seen 11/4/2021 with chest x-ray planned.  Subsequent resolution noted on CT scan.  Improvement in bilateral pleural effusions on most recent CT scan  Additional assessment 4/12/23 with stable small right pleural effusion    7.  Cancer related pain  Pain is most prominent in his right rib, utilizing MS Contin 30 mg 3 times a day  Hydrocodone/acetaminophen 10/325 as needed for breakthrough pain.  Currently taking 3 to 4 tablets daily  He is not currently in need of a refill of pain medications  Discussed MS Contin at 30 mg p.o. every 8 hours, Norco 10/325 2 p.o. every 6 hours  He is currently using pain medication as needed for jaw pain.  Requiring approximately 1 MS Contin and 1 Norco 10/325 daily, however some days not requiring any pain medication.  Reports his pain is well controlled, not currently requiring pain medication.  As of 3/6/2023 patient continues to report no need for pain medication at this time.    8.  Insomnia  Continuing to follow with behavioral oncology  Continuing Remeron 7.5 mg nightly with fair control.  The patient  when assessed 12/30/2022.  1/20/2023 continues on Remeron 7.5 mg nightly which  will be refilled     9. Health maintenance  Status post COVID-19 booster, SARS antibody pending today  The patient is due for Shingrix and Prevnar which will both be administered in the clinic 9/23/2021    10.  Skin lesions  Uncertain of etiology, unexpected findings for usual skin lesions associated with immunotherapy  Request dermatologic assessment-psoriasiform dermatitis.  Seen by Dr. Damon though no follow-up due to personal preference  The lesions on the lower extremities particularly are quite extensive.  At present he feels that they are stable enough not to require additional therapy.  These are also considerably improved 3/12/2024    11.  Right knee pain  Osteoarthritis, orthopedic assessment upon patient's request.   Resolved.  Denies any knee pain today     12.  Osteonecrosis of the jaw  Xgeva was discontinued.  Patient went to second opinion with  with oral surgery.  He was reffered to Dr. Escalante with , with whom he had consult on 8/23/2022.  Dr. Escalante recommened an outpatient procedure that would allow him to biopsy the affected area of his jawbone and identify which bacteria was present, they would then coordinate with ID to identify best antibiotic option.  Patient is agreeable to proceeding with this plan, and is awaiting surgery date.  The patient underwent surgery 10/7/2022  Follow-up with Dr. Champion, infectious disease 10/27/2022 with recommendations for 6 weeks of Levaquin and fluconazole.  This should complete by approximately mid December 2023.  Was seen by Dr. Champion 12/8/2022 with plans to complete fluconazole.  Decision made to start Augmentin 500 mg twice daily for prevention.  Patient currently being followed by Dr. Champion with reassessment planned in June 2023  Resolved status post oral surgery assessment, infectious disease        Plan:   Again, hold any further immunotherapy-Keytruda  Continue Imbruvica 420 mg daily.  Continue Remeron 15 mg p.o. nightly  Patient now grieving  the loss of his wife  13 weeks-Guardant Reveal-lung-CBC, CMP  16 weeks MD  Every 6 week port flush         Patient is on a high risk medication requiring close monitoring for toxicity.      Jostin Parekh MD   3/12/2024            none